# Patient Record
Sex: FEMALE | Race: WHITE | Employment: FULL TIME | URBAN - METROPOLITAN AREA
[De-identification: names, ages, dates, MRNs, and addresses within clinical notes are randomized per-mention and may not be internally consistent; named-entity substitution may affect disease eponyms.]

---

## 2018-07-16 ENCOUNTER — HOSPITAL ENCOUNTER (OUTPATIENT)
Age: 33
Discharge: HOME | End: 2018-07-18
Payer: COMMERCIAL

## 2018-07-16 DIAGNOSIS — N92.1 MENORRHAGIA WITH IRREGULAR CYCLE: ICD-10-CM

## 2018-07-16 PROCEDURE — 84702 CHORIONIC GONADOTROPIN TEST: CPT

## 2018-07-16 PROCEDURE — 84146 ASSAY OF PROLACTIN: CPT

## 2018-07-16 PROCEDURE — 84439 ASSAY OF FREE THYROXINE: CPT

## 2018-07-16 PROCEDURE — 84481 FREE ASSAY (FT-3): CPT

## 2018-07-16 PROCEDURE — 84443 ASSAY THYROID STIM HORMONE: CPT

## 2018-07-16 PROCEDURE — 85027 COMPLETE CBC AUTOMATED: CPT

## 2018-07-17 LAB
GONADOTROPIN, CHORIONIC (HCG) QUANT: <0.1 MIU/ML
HCT VFR BLD CALC: 30.8 % (ref 34–48)
HEMOGLOBIN: 8.9 G/DL (ref 11.5–15.5)
MCH RBC QN AUTO: 20.7 PG (ref 26–35)
MCHC RBC AUTO-ENTMCNC: 28.9 % (ref 32–34.5)
MCV RBC AUTO: 71.6 FL (ref 80–99.9)
PDW BLD-RTO: 19.1 FL (ref 11.5–15)
PLATELET # BLD: 349 E9/L (ref 130–450)
PMV BLD AUTO: 10.6 FL (ref 7–12)
PROLACTIN: 9.48 NG/ML
RBC # BLD: 4.3 E12/L (ref 3.5–5.5)
T3 FREE: 2.8 PG/ML (ref 2–4.4)
T4 FREE: 0.96 NG/DL (ref 0.93–1.7)
TSH SERPL DL<=0.05 MIU/L-ACNC: 3.13 UIU/ML (ref 0.27–4.2)
WBC # BLD: 7.9 E9/L (ref 4.5–11.5)

## 2018-10-15 ENCOUNTER — HOSPITAL ENCOUNTER (OUTPATIENT)
Age: 33
Discharge: HOME | End: 2018-10-17
Payer: COMMERCIAL

## 2018-10-15 PROCEDURE — 88175 CYTOPATH C/V AUTO FLUID REDO: CPT

## 2018-11-27 ENCOUNTER — HOSPITAL ENCOUNTER (OUTPATIENT)
Age: 33
Discharge: HOME | End: 2018-11-29
Payer: COMMERCIAL

## 2018-11-27 PROCEDURE — 88305 TISSUE EXAM BY PATHOLOGIST: CPT

## 2019-01-15 ENCOUNTER — HOSPITAL ENCOUNTER (OUTPATIENT)
Age: 34
Discharge: HOME | End: 2019-01-17

## 2019-01-15 PROCEDURE — 88307 TISSUE EXAM BY PATHOLOGIST: CPT

## 2019-01-15 PROCEDURE — 88342 IMHCHEM/IMCYTCHM 1ST ANTB: CPT

## 2019-01-15 PROCEDURE — 88305 TISSUE EXAM BY PATHOLOGIST: CPT

## 2019-07-11 ENCOUNTER — HOSPITAL ENCOUNTER (OUTPATIENT)
Age: 34
Discharge: HOME | End: 2019-07-13
Payer: COMMERCIAL

## 2019-07-11 PROCEDURE — 88175 CYTOPATH C/V AUTO FLUID REDO: CPT

## 2020-03-11 ENCOUNTER — HOSPITAL ENCOUNTER (OUTPATIENT)
Age: 35
Discharge: HOME | End: 2020-03-13
Payer: COMMERCIAL

## 2020-03-11 PROCEDURE — 88175 CYTOPATH C/V AUTO FLUID REDO: CPT

## 2020-03-11 PROCEDURE — 87624 HPV HI-RISK TYP POOLED RSLT: CPT

## 2020-03-30 LAB
HPV SAMPLE: NORMAL
HPV TYPE 16: NOT DETECTED
HPV TYPE 18: NOT DETECTED
HPV, HIGH RISK OTHER: NOT DETECTED
INTERPRETATION: NORMAL
SOURCE: NORMAL

## 2020-08-24 ENCOUNTER — HOSPITAL ENCOUNTER (OUTPATIENT)
Age: 35
Discharge: HOME | End: 2020-08-26
Payer: COMMERCIAL

## 2020-08-24 LAB
BACTERIA: ABNORMAL /HPF
BILIRUBIN URINE: NEGATIVE
BLOOD, URINE: NEGATIVE
CLARITY: CLEAR
COLOR: YELLOW
EPITHELIAL CELLS, UA: ABNORMAL /HPF
GLUCOSE URINE: NEGATIVE MG/DL
KETONES, URINE: NEGATIVE MG/DL
LEUKOCYTE ESTERASE, URINE: ABNORMAL
NITRITE, URINE: NEGATIVE
PH UA: 6 (ref 5–9)
PROTEIN UA: NEGATIVE MG/DL
RBC UA: ABNORMAL /HPF (ref 0–2)
SPECIFIC GRAVITY UA: 1.01 (ref 1–1.03)
UROBILINOGEN, URINE: 0.2 E.U./DL
WBC UA: ABNORMAL /HPF (ref 0–5)

## 2020-08-24 PROCEDURE — 87591 N.GONORRHOEAE DNA AMP PROB: CPT

## 2020-08-24 PROCEDURE — 81001 URINALYSIS AUTO W/SCOPE: CPT

## 2020-08-24 PROCEDURE — 87491 CHLMYD TRACH DNA AMP PROBE: CPT

## 2020-08-24 PROCEDURE — 87088 URINE BACTERIA CULTURE: CPT

## 2020-08-26 LAB — URINE CULTURE, ROUTINE: NORMAL

## 2020-08-27 LAB
C. TRACHOMATIS DNA ,URINE: NEGATIVE
N. GONORRHOEAE DNA, URINE: NEGATIVE
SOURCE: NORMAL

## 2020-09-09 ENCOUNTER — HOSPITAL ENCOUNTER (OUTPATIENT)
Age: 35
Discharge: HOME | End: 2020-09-11
Payer: COMMERCIAL

## 2020-09-09 LAB
GLUCOSE TOLERANCE TEST 1 HOUR: 77 MG/DL
GLUCOSE TOLERANCE TEST 2 HOUR: 59 MG/DL
GLUCOSE TOLERANCE TEST FASTING: 69 MG/DL
HBA1C MFR BLD: 4.9 % (ref 4–5.6)
HCT VFR BLD CALC: 37.4 % (ref 34–48)
HEMOGLOBIN: 12 G/DL (ref 11.5–15.5)
MCH RBC QN AUTO: 26.5 PG (ref 26–35)
MCHC RBC AUTO-ENTMCNC: 32.1 % (ref 32–34.5)
MCV RBC AUTO: 82.7 FL (ref 80–99.9)
PDW BLD-RTO: 23.3 FL (ref 11.5–15)
PLATELET # BLD: 196 E9/L (ref 130–450)
PMV BLD AUTO: 11.9 FL (ref 7–12)
RBC # BLD: 4.52 E12/L (ref 3.5–5.5)
TSH SERPL DL<=0.05 MIU/L-ACNC: 3.08 UIU/ML (ref 0.27–4.2)
WBC # BLD: 9.5 E9/L (ref 4.5–11.5)

## 2020-09-09 PROCEDURE — 86850 RBC ANTIBODY SCREEN: CPT

## 2020-09-09 PROCEDURE — 82951 GLUCOSE TOLERANCE TEST (GTT): CPT

## 2020-09-09 PROCEDURE — 85027 COMPLETE CBC AUTOMATED: CPT

## 2020-09-09 PROCEDURE — 84443 ASSAY THYROID STIM HORMONE: CPT

## 2020-09-09 PROCEDURE — 86703 HIV-1/HIV-2 1 RESULT ANTBDY: CPT

## 2020-09-09 PROCEDURE — 86900 BLOOD TYPING SEROLOGIC ABO: CPT

## 2020-09-09 PROCEDURE — 86592 SYPHILIS TEST NON-TREP QUAL: CPT

## 2020-09-09 PROCEDURE — 86762 RUBELLA ANTIBODY: CPT

## 2020-09-09 PROCEDURE — 86803 HEPATITIS C AB TEST: CPT

## 2020-09-09 PROCEDURE — 86777 TOXOPLASMA ANTIBODY: CPT

## 2020-09-09 PROCEDURE — 83036 HEMOGLOBIN GLYCOSYLATED A1C: CPT

## 2020-09-09 PROCEDURE — 86901 BLOOD TYPING SEROLOGIC RH(D): CPT

## 2020-09-09 PROCEDURE — 86778 TOXOPLASMA ANTIBODY IGM: CPT

## 2020-09-09 PROCEDURE — 87340 HEPATITIS B SURFACE AG IA: CPT

## 2020-09-09 PROCEDURE — 86787 VARICELLA-ZOSTER ANTIBODY: CPT

## 2020-09-10 LAB
ABO/RH: NORMAL
ANTIBODY SCREEN: NORMAL
HEPATITIS B SURFACE ANTIGEN INTERPRETATION: NORMAL
HEPATITIS C ANTIBODY INTERPRETATION: NORMAL
HIV-1 AND HIV-2 ANTIBODIES: NORMAL
RPR: NORMAL
RUBELLA ANTIBODY IGG: NORMAL
VARICELLA-ZOSTER VIRUS AB, IGG: NORMAL

## 2020-09-16 LAB
TOXOPLASMA IGM ANTIBODY: NORMAL
TOXOPLASMOSIS IGG AB: NORMAL

## 2021-01-07 DIAGNOSIS — O09.523 MULTIGRAVIDA OF ADVANCED MATERNAL AGE IN THIRD TRIMESTER: ICD-10-CM

## 2021-01-07 LAB
GLUCOSE TOLERANCE TEST 1 HOUR: 155 MG/DL
GLUCOSE TOLERANCE TEST 2 HOUR: 80 MG/DL
GLUCOSE TOLERANCE TEST FASTING: 70 MG/DL
HCT VFR BLD CALC: 36.6 % (ref 34–48)
HEMOGLOBIN: 11.8 G/DL (ref 11.5–15.5)
MCH RBC QN AUTO: 31.7 PG (ref 26–35)
MCHC RBC AUTO-ENTMCNC: 32.2 % (ref 32–34.5)
MCV RBC AUTO: 98.4 FL (ref 80–99.9)
PDW BLD-RTO: 14.1 FL (ref 11.5–15)
PLATELET # BLD: 180 E9/L (ref 130–450)
PMV BLD AUTO: 11.5 FL (ref 7–12)
RBC # BLD: 3.72 E12/L (ref 3.5–5.5)
WBC # BLD: 11.6 E9/L (ref 4.5–11.5)

## 2021-01-08 LAB
ABO/RH: NORMAL
ANTIBODY SCREEN: NORMAL

## 2021-03-01 DIAGNOSIS — O09.523 HIGH-RISK PREGNANCY, ELDERLY MULTIGRAVIDA IN THIRD TRIMESTER: ICD-10-CM

## 2021-03-01 LAB
HCT VFR BLD CALC: 34.9 % (ref 34–48)
HEMOGLOBIN: 11.5 G/DL (ref 11.5–15.5)
MCH RBC QN AUTO: 30.8 PG (ref 26–35)
MCHC RBC AUTO-ENTMCNC: 33 % (ref 32–34.5)
MCV RBC AUTO: 93.6 FL (ref 80–99.9)
PDW BLD-RTO: 13.2 FL (ref 11.5–15)
PLATELET # BLD: 181 E9/L (ref 130–450)
PMV BLD AUTO: 12.2 FL (ref 7–12)
RBC # BLD: 3.73 E12/L (ref 3.5–5.5)
WBC # BLD: 12 E9/L (ref 4.5–11.5)

## 2021-03-04 ENCOUNTER — HOSPITAL ENCOUNTER (INPATIENT)
Age: 36
LOS: 3 days | Discharge: HOME | End: 2021-03-08
Attending: EMERGENCY MEDICINE | Admitting: OBSTETRICS & GYNECOLOGY
Payer: COMMERCIAL

## 2021-03-04 DIAGNOSIS — O34.219 PREVIOUS CESAREAN SECTION COMPLICATING PREGNANCY: ICD-10-CM

## 2021-03-04 DIAGNOSIS — O34.219 DECLINES VBAC (VAGINAL BIRTH AFTER CESAREAN) TRIAL: ICD-10-CM

## 2021-03-04 DIAGNOSIS — I10 HYPERTENSION, UNSPECIFIED TYPE: Primary | ICD-10-CM

## 2021-03-04 DIAGNOSIS — O09.293 HISTORY OF PRE-ECLAMPSIA IN PRIOR PREGNANCY, CURRENTLY PREGNANT IN THIRD TRIMESTER: ICD-10-CM

## 2021-03-04 DIAGNOSIS — O09.523 HIGH RISK PREGNANCY, MULTIGRAVIDA OF ADVANCED MATERNAL AGE IN THIRD TRIMESTER: ICD-10-CM

## 2021-03-04 DIAGNOSIS — O09.893 HISTORY OF PRETERM DELIVERY, CURRENTLY PREGNANT IN THIRD TRIMESTER: ICD-10-CM

## 2021-03-04 LAB
BASOPHILS ABSOLUTE: 0.02 E9/L (ref 0–0.2)
BASOPHILS RELATIVE PERCENT: 0.2 % (ref 0–2)
C TRACH DNA GENITAL QL NAA+PROBE: NEGATIVE
EOSINOPHILS ABSOLUTE: 0.3 E9/L (ref 0.05–0.5)
EOSINOPHILS RELATIVE PERCENT: 2.4 % (ref 0–6)
GROUP B STREP CULTURE: NORMAL
HCT VFR BLD CALC: 34.1 % (ref 34–48)
HEMOGLOBIN: 11.4 G/DL (ref 11.5–15.5)
IMMATURE GRANULOCYTES #: 0.08 E9/L
IMMATURE GRANULOCYTES %: 0.7 % (ref 0–5)
LYMPHOCYTES ABSOLUTE: 2.47 E9/L (ref 1.5–4)
LYMPHOCYTES RELATIVE PERCENT: 20.1 % (ref 20–42)
MCH RBC QN AUTO: 30.8 PG (ref 26–35)
MCHC RBC AUTO-ENTMCNC: 33.4 % (ref 32–34.5)
MCV RBC AUTO: 92.2 FL (ref 80–99.9)
MONOCYTES ABSOLUTE: 0.72 E9/L (ref 0.1–0.95)
MONOCYTES RELATIVE PERCENT: 5.9 % (ref 2–12)
N. GONORRHOEAE DNA: NEGATIVE
NEUTROPHILS ABSOLUTE: 8.67 E9/L (ref 1.8–7.3)
NEUTROPHILS RELATIVE PERCENT: 70.7 % (ref 43–80)
PDW BLD-RTO: 13.2 FL (ref 11.5–15)
PLATELET # BLD: 195 E9/L (ref 130–450)
PMV BLD AUTO: 11.6 FL (ref 7–12)
RBC # BLD: 3.7 E12/L (ref 3.5–5.5)
SOURCE: NORMAL
WBC # BLD: 12.3 E9/L (ref 4.5–11.5)

## 2021-03-04 PROCEDURE — 85025 COMPLETE CBC W/AUTO DIFF WBC: CPT

## 2021-03-04 PROCEDURE — 80076 HEPATIC FUNCTION PANEL: CPT

## 2021-03-04 PROCEDURE — 99284 EMERGENCY DEPT VISIT MOD MDM: CPT

## 2021-03-04 PROCEDURE — 84550 ASSAY OF BLOOD/URIC ACID: CPT

## 2021-03-04 PROCEDURE — 80048 BASIC METABOLIC PNL TOTAL CA: CPT

## 2021-03-05 ENCOUNTER — ANESTHESIA EVENT (OUTPATIENT)
Dept: LABOR AND DELIVERY | Age: 36
End: 2021-03-05
Payer: COMMERCIAL

## 2021-03-05 ENCOUNTER — ANCILLARY PROCEDURE (OUTPATIENT)
Dept: OBGYN CLINIC | Age: 36
End: 2021-03-05
Payer: COMMERCIAL

## 2021-03-05 PROBLEM — O34.219 PREVIOUS CESAREAN SECTION COMPLICATING PREGNANCY: Status: ACTIVE | Noted: 2021-03-05

## 2021-03-05 PROBLEM — O09.293 HISTORY OF PRE-ECLAMPSIA IN PRIOR PREGNANCY, CURRENTLY PREGNANT IN THIRD TRIMESTER: Status: ACTIVE | Noted: 2021-03-05

## 2021-03-05 PROBLEM — O09.893 HISTORY OF PRETERM DELIVERY, CURRENTLY PREGNANT IN THIRD TRIMESTER: Status: ACTIVE | Noted: 2021-03-05

## 2021-03-05 PROBLEM — Z3A.36 36 WEEKS GESTATION OF PREGNANCY: Status: ACTIVE | Noted: 2021-03-05

## 2021-03-05 PROBLEM — O09.523 HIGH RISK PREGNANCY, MULTIGRAVIDA OF ADVANCED MATERNAL AGE IN THIRD TRIMESTER: Status: ACTIVE | Noted: 2021-03-05

## 2021-03-05 PROBLEM — Z34.93 NORMAL PREGNANCY, THIRD TRIMESTER: Status: ACTIVE | Noted: 2021-03-05

## 2021-03-05 PROBLEM — O16.3 ELEVATED BLOOD PRESSURE AFFECTING PREGNANCY IN THIRD TRIMESTER, ANTEPARTUM: Status: ACTIVE | Noted: 2021-03-05

## 2021-03-05 PROBLEM — O34.219 DECLINES VBAC (VAGINAL BIRTH AFTER CESAREAN) TRIAL: Status: ACTIVE | Noted: 2021-03-05

## 2021-03-05 LAB
ABO/RH: NORMAL
ALBUMIN SERPL-MCNC: 3.4 G/DL (ref 3.5–5.2)
ALP BLD-CCNC: 103 U/L (ref 35–104)
ALT SERPL-CCNC: 12 U/L (ref 0–32)
AMORPHOUS: ABNORMAL
AMPHETAMINE SCREEN, URINE: NOT DETECTED
ANION GAP SERPL CALCULATED.3IONS-SCNC: 10 MMOL/L (ref 7–16)
ANTIBODY SCREEN: NORMAL
AST SERPL-CCNC: 16 U/L (ref 0–31)
BACTERIA: ABNORMAL /HPF
BARBITURATE SCREEN URINE: NOT DETECTED
BENZODIAZEPINE SCREEN, URINE: NOT DETECTED
BILIRUB SERPL-MCNC: <0.2 MG/DL (ref 0–1.2)
BILIRUBIN DIRECT: <0.2 MG/DL (ref 0–0.3)
BILIRUBIN URINE: NEGATIVE
BILIRUBIN, INDIRECT: ABNORMAL MG/DL (ref 0–1)
BLOOD, URINE: NEGATIVE
BUN BLDV-MCNC: 10 MG/DL (ref 6–20)
CALCIUM SERPL-MCNC: 9 MG/DL (ref 8.6–10.2)
CANNABINOID SCREEN URINE: NOT DETECTED
CHLORIDE BLD-SCNC: 107 MMOL/L (ref 98–107)
CLARITY: CLEAR
CO2: 20 MMOL/L (ref 22–29)
COCAINE METABOLITE SCREEN URINE: NOT DETECTED
COLOR: YELLOW
CREAT SERPL-MCNC: 0.6 MG/DL (ref 0.5–1)
FENTANYL SCREEN, URINE: NOT DETECTED
GFR AFRICAN AMERICAN: >60
GFR NON-AFRICAN AMERICAN: >60 ML/MIN/1.73
GLUCOSE BLD-MCNC: 96 MG/DL (ref 74–99)
GLUCOSE URINE: NEGATIVE MG/DL
KETONES, URINE: ABNORMAL MG/DL
LEUKOCYTE ESTERASE, URINE: ABNORMAL
Lab: NORMAL
METHADONE SCREEN, URINE: NOT DETECTED
NITRITE, URINE: NEGATIVE
OPIATE SCREEN URINE: NOT DETECTED
OXYCODONE URINE: NOT DETECTED
PH UA: 6 (ref 5–9)
PHENCYCLIDINE SCREEN URINE: NOT DETECTED
POTASSIUM REFLEX MAGNESIUM: 3.6 MMOL/L (ref 3.5–5)
PROTEIN UA: NEGATIVE MG/DL
RBC UA: ABNORMAL /HPF (ref 0–2)
SODIUM BLD-SCNC: 137 MMOL/L (ref 132–146)
SPECIFIC GRAVITY UA: 1.02 (ref 1–1.03)
TOTAL PROTEIN: 6.2 G/DL (ref 6.4–8.3)
URIC ACID, SERUM: 3.8 MG/DL (ref 2.4–5.7)
UROBILINOGEN, URINE: 0.2 E.U./DL
WBC UA: ABNORMAL /HPF (ref 0–5)

## 2021-03-05 PROCEDURE — 6360000002 HC RX W HCPCS

## 2021-03-05 PROCEDURE — 81001 URINALYSIS AUTO W/SCOPE: CPT

## 2021-03-05 PROCEDURE — 36415 COLL VENOUS BLD VENIPUNCTURE: CPT

## 2021-03-05 PROCEDURE — 6370000000 HC RX 637 (ALT 250 FOR IP): Performed by: OBSTETRICS & GYNECOLOGY

## 2021-03-05 PROCEDURE — 99253 IP/OBS CNSLTJ NEW/EST LOW 45: CPT | Performed by: OBSTETRICS & GYNECOLOGY

## 2021-03-05 PROCEDURE — 76819 FETAL BIOPHYS PROFIL W/O NST: CPT | Performed by: OBSTETRICS & GYNECOLOGY

## 2021-03-05 PROCEDURE — 6360000002 HC RX W HCPCS: Performed by: OBSTETRICS & GYNECOLOGY

## 2021-03-05 PROCEDURE — 86850 RBC ANTIBODY SCREEN: CPT

## 2021-03-05 PROCEDURE — 86901 BLOOD TYPING SEROLOGIC RH(D): CPT

## 2021-03-05 PROCEDURE — 1220000000 HC SEMI PRIVATE OB R&B

## 2021-03-05 PROCEDURE — 86900 BLOOD TYPING SEROLOGIC ABO: CPT

## 2021-03-05 PROCEDURE — 2580000003 HC RX 258: Performed by: OBSTETRICS & GYNECOLOGY

## 2021-03-05 PROCEDURE — 76820 UMBILICAL ARTERY ECHO: CPT | Performed by: OBSTETRICS & GYNECOLOGY

## 2021-03-05 PROCEDURE — 80307 DRUG TEST PRSMV CHEM ANLYZR: CPT

## 2021-03-05 PROCEDURE — 76805 OB US >/= 14 WKS SNGL FETUS: CPT | Performed by: OBSTETRICS & GYNECOLOGY

## 2021-03-05 RX ORDER — ASPIRIN 81 MG/1
81 TABLET ORAL DAILY
Status: ON HOLD | COMMUNITY
End: 2021-03-06

## 2021-03-05 RX ORDER — ZOLPIDEM TARTRATE 5 MG/1
5 TABLET ORAL ONCE
Status: COMPLETED | OUTPATIENT
Start: 2021-03-05 | End: 2021-03-05

## 2021-03-05 RX ORDER — BETAMETHASONE SODIUM PHOSPHATE AND BETAMETHASONE ACETATE 3; 3 MG/ML; MG/ML
12 INJECTION, SUSPENSION INTRA-ARTICULAR; INTRALESIONAL; INTRAMUSCULAR; SOFT TISSUE EVERY 24 HOURS
Status: COMPLETED | OUTPATIENT
Start: 2021-03-05 | End: 2021-03-05

## 2021-03-05 RX ORDER — SODIUM CHLORIDE 0.9 % (FLUSH) 0.9 %
10 SYRINGE (ML) INJECTION 2 TIMES DAILY
Status: DISCONTINUED | OUTPATIENT
Start: 2021-03-05 | End: 2021-03-06

## 2021-03-05 RX ORDER — BETAMETHASONE SODIUM PHOSPHATE AND BETAMETHASONE ACETATE 3; 3 MG/ML; MG/ML
INJECTION, SUSPENSION INTRA-ARTICULAR; INTRALESIONAL; INTRAMUSCULAR; SOFT TISSUE
Status: COMPLETED
Start: 2021-03-05 | End: 2021-03-05

## 2021-03-05 RX ORDER — BETAMETHASONE SODIUM PHOSPHATE AND BETAMETHASONE ACETATE 3; 3 MG/ML; MG/ML
12 INJECTION, SUSPENSION INTRA-ARTICULAR; INTRALESIONAL; INTRAMUSCULAR; SOFT TISSUE EVERY 24 HOURS
Status: DISCONTINUED | OUTPATIENT
Start: 2021-03-05 | End: 2021-03-05

## 2021-03-05 RX ADMIN — BETAMETHASONE SODIUM PHOSPHATE AND BETAMETHASONE ACETATE 12 MG: 3; 3 INJECTION, SUSPENSION INTRA-ARTICULAR; INTRALESIONAL; INTRAMUSCULAR; SOFT TISSUE at 03:21

## 2021-03-05 RX ADMIN — ZOLPIDEM TARTRATE 5 MG: 5 TABLET ORAL at 04:02

## 2021-03-05 RX ADMIN — Medication 10 ML: at 21:00

## 2021-03-05 RX ADMIN — BETAMETHASONE SODIUM PHOSPHATE AND BETAMETHASONE ACETATE 12 MG: 3; 3 INJECTION, SUSPENSION INTRA-ARTICULAR; INTRALESIONAL; INTRAMUSCULAR; SOFT TISSUE at 23:13

## 2021-03-05 RX ADMIN — Medication 10 ML: at 13:52

## 2021-03-05 ASSESSMENT — PAIN - FUNCTIONAL ASSESSMENT: PAIN_FUNCTIONAL_ASSESSMENT: 0-10

## 2021-03-05 ASSESSMENT — LIFESTYLE VARIABLES: SMOKING_STATUS: 0

## 2021-03-05 NOTE — CONSULTS
Criss Portillo M.D.  [address] [place] [address], [address] [place] [address]     RE:  Sharmaine Wilson  : 1985   AGE: 38 y.o. This report has been created using voice recognition software. It may contain errors which are inherent in voice recognition technology. Dear Dr. Angeles Fernandes:    Skyler Benedict had a consultation today for the following indications:    Patient Active Problem List   Diagnosis    36 weeks gestation of pregnancy    High risk pregnancy, multigravida of advanced maternal age in third trimester    History of pre-eclampsia in prior pregnancy, currently pregnant in third trimester    Previous  section x 3 complicating pregnancy    Declines  (vaginal birth after ) trial    Elevated blood pressure affecting pregnancy in third trimester, antepartum    Hypertension     Skyler Benedict is a 38 y.o. female, who is G5(2,1,1,3). She has an Estimated Date of Delivery: 3/29/21 based on her previous ultrasound assessment. She is currently 36 weeks 4 days gestation based on that assessment. The patient was admitted to the labor and delivery unit for evaluation and management secondary to a complaint of edema and elevated blood pressures. Her blood pressure since admission have intermittently been in the severe range. She had a history of preeclampsia with her first pregnancy, necessitating delivery at 27 weeks gestational age. She takes no antihypertensive medication. She had no symptomatology related to hypertension. The patient has had 3 previous  section deliveries and plans to have a repeat  section delivery with her current pregnancy. The fetal heart rate tracing has been reassuring, with moderate variability and accelerations. An occasional uterine contraction was noted. A fetal ultrasound assessment was performed and a detailed report included in the EMR under the media tab.   A living newsome intrauterine fetus was identified in the breech presentation, with normal fetal heart motion and normal fetal motion noted. The amniotic fluid index was 11.6 cm. The estimated fetal weight was at the 89th growth percentile for gestational age. Visualization of the fetal anatomy was limited secondary to the advanced gestational age of the fetus and fetal position. The biophysical profile and cord Doppler studies were both reassuring. There was no evidence of absence, or reversal of end-diastolic flow.                  GENETIC SCREENING/TERATOLOGY COUNSELING              (Includes patient, FTB, and any affected family members)    Patient Age > 35 Years YES   Thalassemia ( MVC<80) NO   Congential Heart Defect NO   Neural Tube Defect NO   Jacob-Sachs NO   Sickle Cell Disease NO   Sickle Cell Trait NO   Sickle C Disease or Trait NO   Hemophilia NO   Muscular Dystrophy NO   Cystic Fibrosis NO   Ron Disease NO   Autism NO   Mental Retardation NO   History of Fragile X NO   Maternal Diabetes NO   Other Genetic Disease or Syndrome NO   Previous Child With Congenital Abnormality Not Listed NO   Recreational Drugs NO                                                                 INFECTION HISTORY     HEPATITIS IMMUNIZED:  NO   HEPATITIS INFECTION:  NO   EXPOSURE TO TB NO   GENITAL HERPES    NO   PARVOVIRUS B-19 NO   CHICKEN POX  YES   MEASLES NO   HIV NO   OTHER RASH OR VIRAL ILLNESS SINCE LMP NO   UTI RECURRENT NO   HPV NO     OB History    Para Term  AB Living   5 3 2 1 1 3   SAB TAB Ectopic Molar Multiple Live Births   1         3      # Outcome Date GA Lbr Hilario/2nd Weight Sex Delivery Anes PTL Lv   5 Current            4 SAB  5w0d          3 Term 05/14/10 39w0d  7 lb 14 oz (3.572 kg) F CS-LTranv Spinal N       Complications: S/P repeat low transverse    2 Term 10/27/08 39w0d  7 lb 9 oz (3.43 kg) M CS-LTranv Spinal N SEAN      Complications: S/P repeat low transverse    1  05 35w0d  5 lb 7 oz (2.466 kg) M CS-LTranv Spinal N SEAN      Complications: Severe preeclampsia       PAST GYNECOLOGICAL  HISTORY:  Positive for abnormal pap smears. Doesn't know the grade  Negative for sexually transmitted diseases. Positive for cervical LEEP   Positive for uterine surgery. Three previous C-sections  Negative for ovarian or tubal surgery. Past Medical History:   Diagnosis Date    Hypertension        Past Surgical History:   Procedure Laterality Date     SECTION      x3    COLPOSCOPY  2018    LEEP  01/15/2019       Allergies   Allergen Reactions    Percocet [Oxycodone-Acetaminophen]     Procardia [Nifedipine]        Current Facility-Administered Medications:     betamethasone acetate-betamethasone sodium phosphate (CELESTONE) injection 12 mg, 12 mg, Intramuscular, Q24H, Danielle Blanco MD    sodium chloride flush 0.9 % injection 10 mL, 10 mL, Intravenous, BID, Danielle Blanco MD, 10 mL at 21 1352    Social History     Tobacco Use    Smoking status: Never Smoker    Smokeless tobacco: Never Used   Substance Use Topics    Alcohol use: No     Comment: occ     FAMILY MEDICAL HISTORY:   Negative for congenital abnormalities, autism, genetic disease and mental retardation, not listed above. Review of Systems :   CONSTITUTIONAL : No fever, no chills   HEENT : No headache, no visual changes, no rhinorrhea, no sore throat   CARDIOVASCULAR : No pain, no palpitations, no edema   RESPIRATORY : No pain, no shortness of breath   GASTROINTESTINAL : No N/V, no D/C, no abdominal pain   GENITOURINARY : No dysuria, hematuria and no incontinence   MUSCULOSKELETAL : No myalgia, No back pain  NEUROLOGICAL : No numbness, no tingling, no tremors. No history of seizures  ALL OTHER SYSTEMS WERE REPORTED AS NEGATIVE.     PERTINENT PHYSICAL EXAMINATION:   Patient Vitals for the past 24 hrs:   BP Temp Temp src Pulse Resp SpO2 Height Weight   21 1441 (!) 139/91 -- -- 85 -- -- -- --   21 1240 (!) 147/73 -- -- 80 -- -- -- --   03/05/21 1037 (!) 145/81 -- -- 88 -- -- -- --   03/05/21 0937 138/77 -- -- 83 -- -- -- --   03/05/21 0911 (!) 168/88 -- -- 98 -- -- -- --   03/05/21 0840 (!) 164/77 -- -- 85 -- -- -- --   03/05/21 0837 (!) 169/83 -- -- 82 -- -- -- --   03/05/21 0734 (!) 141/80 98.2 °F (36.8 °C) Oral 83 18 -- -- --   03/05/21 0704 (!) 152/80 -- -- 80 -- -- -- --   03/05/21 0634 (!) 154/84 -- -- 80 -- -- -- --   03/05/21 0604 (!) 154/79 -- -- 82 -- -- -- --   03/05/21 0534 (!) 156/77 -- -- 76 -- -- -- --   03/05/21 0504 (!) 157/80 -- -- 76 -- -- -- --   03/05/21 0434 (!) 152/79 -- -- 68 -- -- -- --   03/05/21 0359 (!) 158/83 -- -- 72 -- -- -- --   03/05/21 0349 (!) 155/78 -- -- 72 -- -- -- --   03/05/21 0339 (!) 160/82 -- -- 70 -- -- -- --   03/05/21 0327 -- 98.4 °F (36.9 °C) -- -- -- -- -- --   03/05/21 0315 (!) 152/86 -- -- -- -- -- -- --   03/05/21 0236 (!) 150/78 -- -- 87 16 97 % -- --   03/04/21 2240 (!) 140/80 97.8 °F (36.6 °C) Oral 84 18 98 % 5' 4\" (1.626 m) 238 lb (108 kg)     A fetal ultrasound assessment was performed today. A report is enclosed for your review.     Admission on 03/04/2021   Component Date Value Ref Range Status    WBC 03/04/2021 12.3* 4.5 - 11.5 E9/L Final    RBC 03/04/2021 3.70  3.50 - 5.50 E12/L Final    Hemoglobin 03/04/2021 11.4* 11.5 - 15.5 g/dL Final    Hematocrit 03/04/2021 34.1  34.0 - 48.0 % Final    MCV 03/04/2021 92.2  80.0 - 99.9 fL Final    MCH 03/04/2021 30.8  26.0 - 35.0 pg Final    MCHC 03/04/2021 33.4  32.0 - 34.5 % Final    RDW 03/04/2021 13.2  11.5 - 15.0 fL Final    Platelets 40/63/7899 195  130 - 450 E9/L Final    MPV 03/04/2021 11.6  7.0 - 12.0 fL Final    Neutrophils % 03/04/2021 70.7  43.0 - 80.0 % Final    Immature Granulocytes % 03/04/2021 0.7  0.0 - 5.0 % Final    Lymphocytes % 03/04/2021 20.1  20.0 - 42.0 % Final    Monocytes % 03/04/2021 5.9  2.0 - 12.0 % Final    Eosinophils % 03/04/2021 2.4  0.0 - 6.0 % Final    Basophils % 03/04/2021 0.2  0.0 - 2.0 % Final    Neutrophils Absolute 03/04/2021 8.67* 1.80 - 7.30 E9/L Final    Immature Granulocytes # 03/04/2021 0.08  E9/L Final    Lymphocytes Absolute 03/04/2021 2.47  1.50 - 4.00 E9/L Final    Monocytes Absolute 03/04/2021 0.72  0.10 - 0.95 E9/L Final    Eosinophils Absolute 03/04/2021 0.30  0.05 - 0.50 E9/L Final    Basophils Absolute 03/04/2021 0.02  0.00 - 0.20 E9/L Final    Sodium 03/04/2021 137  132 - 146 mmol/L Final    Potassium reflex Magnesium 03/04/2021 3.6  3.5 - 5.0 mmol/L Final    Chloride 03/04/2021 107  98 - 107 mmol/L Final    CO2 03/04/2021 20* 22 - 29 mmol/L Final    Anion Gap 03/04/2021 10  7 - 16 mmol/L Final    Glucose 03/04/2021 96  74 - 99 mg/dL Final    BUN 03/04/2021 10  6 - 20 mg/dL Final    CREATININE 03/04/2021 0.6  0.5 - 1.0 mg/dL Final    GFR Non- 03/04/2021 >60  >=60 mL/min/1.73 Final    GFR  03/04/2021 >60   Final    Calcium 03/04/2021 9.0  8.6 - 10.2 mg/dL Final    Total Protein 03/04/2021 6.2* 6.4 - 8.3 g/dL Final    Albumin 03/04/2021 3.4* 3.5 - 5.2 g/dL Final    Alkaline Phosphatase 03/04/2021 103  35 - 104 U/L Final    ALT 03/04/2021 12  0 - 32 U/L Final    AST 03/04/2021 16  0 - 31 U/L Final    Total Bilirubin 03/04/2021 <0.2  0.0 - 1.2 mg/dL Final    Bilirubin, Direct 03/04/2021 <0.2  0.0 - 0.3 mg/dL Final    Bilirubin, Indirect 03/04/2021 see below  0.0 - 1.0 mg/dL Final    Uric Acid, Serum 03/04/2021 3.8  2.4 - 5.7 mg/dL Final    Color, UA 03/05/2021 Yellow  Straw/Yellow Final    Clarity, UA 03/05/2021 Clear  Clear Final    Glucose, Ur 03/05/2021 Negative  Negative mg/dL Final    Bilirubin Urine 03/05/2021 Negative  Negative Final    Ketones, Urine 03/05/2021 TRACE* Negative mg/dL Final    Specific Gravity, UA 03/05/2021 1.025  1.005 - 1.030 Final    Blood, Urine 03/05/2021 Negative  Negative Final    pH, UA 03/05/2021 6.0  5.0 - 9.0 Final    Protein, UA 03/05/2021 Negative  Negative mg/dL Final    Urobilinogen, Urine 2021 0.2  <2.0 E.U./dL Final    Nitrite, Urine 2021 Negative  Negative Final    Leukocyte Esterase, Urine 2021 MODERATE* Negative Final    Amphetamine Screen, Urine 2021 NOT DETECTED  Negative <1000 ng/mL Final    Barbiturate Screen, Ur 2021 NOT DETECTED  Negative < 200 ng/mL Final    Benzodiazepine Screen, Urine 2021 NOT DETECTED  Negative < 200 ng/mL Final    Cannabinoid Scrn, Ur 2021 NOT DETECTED  Negative < 50ng/mL Final    Cocaine Metabolite Screen, Urine 2021 NOT DETECTED  Negative < 300 ng/mL Final    Opiate Scrn, Ur 2021 NOT DETECTED  Negative < 300ng/mL Final    PCP Screen, Urine 2021 NOT DETECTED  Negative < 25 ng/mL Final    Methadone Screen, Urine 2021 NOT DETECTED  Negative <300 ng/mL Final    Oxycodone Urine 2021 NOT DETECTED  Negative <100 ng/mL Final    FENTANYL SCREEN, URINE 2021 NOT DETECTED  Negative <1 ng/mL Final    Drug Screen Comment: 2021 see below   Final    WBC, UA 2021 2-5  0 - 5 /HPF Final    RBC, UA 2021 1-3  0 - 2 /HPF Final    Bacteria, UA 2021 RARE* None Seen /HPF Final    Amorphous, UA 2021 FEW   Final    ABO/Rh 2021 O POS   Final    Antibody Screen 2021 NEG   Final     IMPRESSION:    1.  IUP at 36 weeks 4 days gestation based on her Estimated Date of Delivery: 3/29/21    2. AMA    3. Three previous C-sections    4. Maternal obesity    5. Declined screening for fetal aneuploidy earlier in her pregnancy     6. Possible UTI    7. Gestatiional hypertension with intermittent severe hypertension      8. Had gestational hypertension with her first pregnancy    9. Prior  delivery for severe pre-eclampsia    10. Reassuring biophysical profile cord Doppler testing  11. Luca breech presentation  12.   Estimated fetal weight is at the 89th growth percentile    PLAN:  As we discussed at the time of the patient's assessment, I recommended the patient be delivered after completing the Celestone administration secondary to gestational hypertension with intermittent severe elevations in her blood pressure. My recommendation is based on the ACOG Committee Opinion #[id number], from February 2021. The patient will complete the Celestone course today. A urine culture and sensitivity was ordered. DVT prophylaxis should be utilized throughout her admission. Further evaluation and management will be dependent on the results of patient's testing and her clinical presentation. If you have any questions regarding her management, please contact me at your convenience and thank you for allowing me to participate in her care.     Sincerely,        Raimundo Olsen MD, MS, Trav Ruiz, ALEJANDROCS, RDMS, RVT  Director [address] [place] [address]  [phone]

## 2021-03-05 NOTE — PROGRESS NOTES
at 36w4d presents foir leg swelling and increased BPs at home. Denies any HA, N/V, or visual changes. Reports good fetal movement. denies any vb, LOF or ctxs.

## 2021-03-05 NOTE — PROGRESS NOTES
Dr. Lizandro Zuniga updated on BP since 0911. New orders received, cycle BPs q 2 hours, patient may eat lunch if BPs stable.

## 2021-03-05 NOTE — ANESTHESIA PRE PROCEDURE
Department of Anesthesiology  Preprocedure Note       Name:  Ariana Gomez   Age:  38 y.o.  :  1985                                          MRN:  [id number]         Date:  3/5/2021      Surgeon: Anoop Helm):  Jules Torres MD    Procedure: Procedure(s):   SECTION    Medications prior to admission:   Prior to Admission medications    Medication Sig Start Date End Date Taking? Authorizing Provider   aspirin 81 MG EC tablet Take 81 mg by mouth daily   Yes Historical Provider, MD   Prenat w/o P-HR-[id number]-[id number]-DHA (PNV-DHA PO) Take by mouth   Yes Historical Provider, MD       Current medications:    Current Facility-Administered Medications   Medication Dose Route Frequency Provider Last Rate Last Admin    betamethasone acetate-betamethasone sodium phosphate (CELESTONE) injection 12 mg  12 mg Intramuscular Q24H Jules Torres MD           Allergies: Allergies   Allergen Reactions    Percocet [Oxycodone-Acetaminophen]     Procardia [Nifedipine]        Problem List:    Patient Active Problem List   Diagnosis Code    36 weeks gestation of pregnancy Z3A.36    High risk pregnancy, multigravida of advanced maternal age in third trimester O09.523    History of pre-eclampsia in prior pregnancy, currently pregnant in third trimester O0.36    History of  delivery at 27 weeks , first pregnancy with severe preeclampsia, currently pregnant in third trimester O09.893    Previous  section x 3 complicating pregnancy H21.461    Declines  (vaginal birth after ) trial O31.200    Normal pregnancy, third trimester Z34.93       Past Medical History:  History reviewed. No pertinent past medical history.     Past Surgical History:        Procedure Laterality Date     SECTION      x3    COLPOSCOPY  2018    LEEP  01/15/2019       Social History:    Social History     Tobacco Use    Smoking status: Never Smoker    Smokeless tobacco: Never Used   Substance Use Topics    Alcohol use: No     Comment: occ                                Counseling given: Not Answered      Vital Signs (Current):   Vitals:    03/05/21 0840 03/05/21 0911 03/05/21 0937 03/05/21 1037   BP: (!) 164/77 (!) 168/88 138/77 (!) 145/81   Pulse: 85 98 83 88   Resp:       Temp:       TempSrc:       SpO2:       Weight:       Height:                                                  BP Readings from Last 3 Encounters:   03/05/21 (!) 145/81   03/01/21 120/80   02/04/21 120/82       NPO Status:                                                                                 BMI:   Wt Readings from Last 3 Encounters:   03/04/21 238 lb (108 kg)   03/01/21 238 lb 9.6 oz (108.2 kg)   02/04/21 228 lb (103.4 kg)     Body mass index is 40.85 kg/m². CBC:   Lab Results   Component Value Date    WBC 12.3 03/04/2021    RBC 3.70 03/04/2021    HGB 11.4 03/04/2021    HCT 34.1 03/04/2021    MCV 92.2 03/04/2021    RDW 13.2 03/04/2021     03/04/2021       CMP:   Lab Results   Component Value Date     03/04/2021    K 3.6 03/04/2021     03/04/2021    CO2 20 03/04/2021    BUN 10 03/04/2021    CREATININE 0.6 03/04/2021    GFRAA >60 03/04/2021    LABGLOM >60 03/04/2021    GLUCOSE 96 03/04/2021    PROT 6.2 03/04/2021    CALCIUM 9.0 03/04/2021    BILITOT <0.2 03/04/2021    ALKPHOS 103 03/04/2021    AST 16 03/04/2021    ALT 12 03/04/2021       POC Tests: No results for input(s): POCGLU, POCNA, POCK, POCCL, POCBUN, POCHEMO, POCHCT in the last 72 hours.     Coags: No results found for: PROTIME, INR, APTT    HCG (If Applicable):   Lab Results   Component Value Date    PREGTESTUR Negative 11/27/2018    HCG 11.8 (H) 08/24/2013        ABGs: No results found for: PHART, PO2ART, HCB4OZZ, XJM5OGH, BEART, E7NSAFTS     Type & Screen (If Applicable):  No results found for: LABABO, LABRH    Drug/Infectious Status (If Applicable):  No results found for: HIV, HEPCAB    COVID-19 Screening (If Applicable): No results found for: 2021    CREATININE 0.6 2021    CALCIUM 9.0 2021    GFRAA >60 2021    LABGLOM >60 2021    GLUCOSE 96 2021     POCGlucose  Recent Labs     21  2304   GLUCOSE 96        Coags  No results found for: PROTIME, INR, APTT    HCG (If Applicable)   Lab Results   Component Value Date    PREGTESTUR Negative 2018    HCG 11.8 (H) 2013        ABGs   No results found for: PHART, PO2ART, UNK5NVO, BHC7FWU, BEART, K5GJOVGR     Type & Screen (If Applicable)  Lab Results   Component Value Date    ABORH O POS 2021     Active Problem List with ICD10 Codes  Patient Active Problem List   Diagnosis Code    36 weeks gestation of pregnancy Z3A.36    High risk pregnancy, multigravida of advanced maternal age in third trimester O09.523    History of pre-eclampsia in prior pregnancy, currently pregnant in third trimester O0.36    History of  delivery at 28 weeks , first pregnancy with severe preeclampsia, currently pregnant in third trimester O09.893    Previous  section x 3 complicating pregnancy G67.437    Declines  (vaginal birth after ) trial O34.219    Normal pregnancy, third trimester Z34.93       SHERICE Landa CRNA  2021  11:32 AM      SHERICE Landa CRNA   3/5/2021

## 2021-03-05 NOTE — H&P
History:    Social History     Socioeconomic History    Marital status:      Spouse name: Not on file    Number of children: Not on file    Years of education: Not on file    Highest education level: Not on file   Occupational History    Not on file   Social Needs    Financial resource strain: Not on file    Food insecurity     Worry: Not on file     Inability: Not on file    Transportation needs     Medical: Not on file     Non-medical: Not on file   Tobacco Use    Smoking status: Never Smoker    Smokeless tobacco: Never Used   Substance and Sexual Activity    Alcohol use: No     Comment: occ    Drug use: No    Sexual activity: Yes     Partners: Male   Lifestyle    Physical activity     Days per week: Not on file     Minutes per session: Not on file    Stress: Not on file   Relationships    Social connections     Talks on phone: Not on file     Gets together: Not on file     Attends [employer] service: Not on file     Active member of club or organization: Not on file     Attends meetings of clubs or organizations: Not on file     Relationship status: Not on file    Intimate partner violence     Fear of current or ex partner: Not on file     Emotionally abused: Not on file     Physically abused: Not on file     Forced sexual activity: Not on file   Other Topics Concern    Not on file   Social History Narrative    Not on file       Family History:   No family history on file. Medications Prior to Admission:  Not in a hospital admission.     REVIEW OF SYSTEMS:  CONSTITUTIONAL:  negative  RESPIRATORY:  negative  CARDIOVASCULAR:  negative  GASTROINTESTINAL:  negative  ALLERGIC/IMMUNOLOGIC:  negative  NEUROLOGICAL:  negative  BEHAVIOR/PSYCH:  negative    PHYSICAL EXAM:  Vitals:    03/04/21 2240 03/05/21 0236   BP: (!) 140/80 (!) 150/78   Pulse: 84 87   Resp: 18 16   Temp: 97.8 °F (36.6 °C)    TempSrc: Oral    SpO2: 98% 97%   Weight: 238 lb (108 kg)    Height: 5' 4\" (1.626 m)      General appearance:  awake, alert, cooperative, no apparent distress, and appears stated age  Skin: warm, dry, normal color, no rash  Heart:  Regular rate & rhythm, S1 S2, no murmurs, rubs, or gallops  Lungs:  No increased work of breathing, good air exchange, CTA b/l. Abdomen:  Soft, non tender, gravid, consistent with her gestational age  Extremities: No clubbing, cyanosis, cords; No calf tenderness; Edema: no  Fetal heart rate:  Reassuring.   Pelvis:  Not examined  Contraction frequency:  0 minutes  Membranes:  Intact    Labs:   Recent Results (from the past 72 hour(s))   CBC Auto Differential    Collection Time: 03/04/21 11:04 PM   Result Value Ref Range    WBC 12.3 (H) 4.5 - 11.5 E9/L    RBC 3.70 3.50 - 5.50 E12/L    Hemoglobin 11.4 (L) 11.5 - 15.5 g/dL    Hematocrit 34.1 34.0 - 48.0 %    MCV 92.2 80.0 - 99.9 fL    MCH 30.8 26.0 - 35.0 pg    MCHC 33.4 32.0 - 34.5 %    RDW 13.2 11.5 - 15.0 fL    Platelets 469 803 - 914 E9/L    MPV 11.6 7.0 - 12.0 fL    Neutrophils % 70.7 43.0 - 80.0 %    Immature Granulocytes % 0.7 0.0 - 5.0 %    Lymphocytes % 20.1 20.0 - 42.0 %    Monocytes % 5.9 2.0 - 12.0 %    Eosinophils % 2.4 0.0 - 6.0 %    Basophils % 0.2 0.0 - 2.0 %    Neutrophils Absolute 8.67 (H) 1.80 - 7.30 E9/L    Immature Granulocytes # 0.08 E9/L    Lymphocytes Absolute 2.47 1.50 - 4.00 E9/L    Monocytes Absolute 0.72 0.10 - 0.95 E9/L    Eosinophils Absolute 0.30 0.05 - 0.50 E9/L    Basophils Absolute 0.02 0.00 - 0.20 J9/Q   Basic Metabolic Panel w/ Reflex to MG    Collection Time: 03/04/21 11:04 PM   Result Value Ref Range    Sodium 137 132 - 146 mmol/L    Potassium reflex Magnesium 3.6 3.5 - 5.0 mmol/L    Chloride 107 98 - 107 mmol/L    CO2 20 (L) 22 - 29 mmol/L    Anion Gap 10 7 - 16 mmol/L    Glucose 96 74 - 99 mg/dL    BUN 10 6 - 20 mg/dL    CREATININE 0.6 0.5 - 1.0 mg/dL    GFR Non-African American >60 >=60 mL/min/1.73    GFR African American >60     Calcium 9.0 8.6 - 10.2 mg/dL   Hepatic Function Panel    Collection Time: 21 11:04 PM   Result Value Ref Range    Total Protein 6.2 (L) 6.4 - 8.3 g/dL    Albumin 3.4 (L) 3.5 - 5.2 g/dL    Alkaline Phosphatase 103 35 - 104 U/L    ALT 12 0 - 32 U/L    AST 16 0 - 31 U/L    Total Bilirubin <0.2 0.0 - 1.2 mg/dL    Bilirubin, Direct <0.2 0.0 - 0.3 mg/dL    Bilirubin, Indirect see below 0.0 - 1.0 mg/dL   Uric acid    Collection Time: 21 11:04 PM   Result Value Ref Range    Uric Acid, Serum 3.8 2.4 - 5.7 mg/dL       ASSESSMENT:   38 y.o.  W female at 37w2d [id number]    Patient Active Problem List   Diagnosis    36 weeks gestation of pregnancy    High risk pregnancy, multigravida of advanced maternal age in third trimester    History of pre-eclampsia in prior pregnancy, currently pregnant in third trimester    History of  delivery at 28 weeks , first pregnancy with severe preeclampsia, currently pregnant in third trimester    Previous  section x 3 complicating pregnancy    Declines  (vaginal birth after ) trial     Fetus: Reassuring  GBS: negative    PLAN:  Orders Placed This Encounter   Procedures    CBC Auto Differential    Basic Metabolic Panel w/ Reflex to MG    Hepatic Function Panel    Uric acid    Urinalysis    DRUG SCREEN MULTI URINE    Diet NPO Effective Now Exceptions are: Ice Chips, Sips of Water with Meds, Sips of Clear Liquids, Popsicles    Vital signs per unit routine    Notify physician for persistent blood pressure elevations, nonreassuring fetal status, abnormal lab results    I/O per unit routine    Continuous external fetal heart rate monitoring    External uterine contraction monitoring    Full Code    Inpatient consult to Obstetrics / Gynecology    Nonrebreather mask oxygen    Insert peripheral IV    PATIENT STATUS (DIRECT) Observation     Current Facility-Administered Medications   Medication Dose Route Frequency Provider Last Rate Last Admin    betamethasone acetate-betamethasone sodium phosphate (CELESTONE)

## 2021-03-05 NOTE — PROGRESS NOTES
Dr. John Patel called in, states he spoke with M regarding this AMs BPs. Plan is for repeat C/s tomorrow 3/6/2021 at 0900, unless BPs continue to be elevated. Orders received.

## 2021-03-05 NOTE — PROGRESS NOTES
BPs reviewed with Dr. Mabel Clayton. Patient currently sitting up eating. Will recheck BP in 30 minutes.

## 2021-03-05 NOTE — ED PROVIDER NOTES
HPI:  3/5/21,   Time: 1:47 AM YENNY Gomez is a 38 y.o. female presenting to the ED for elevated blood pressure. Patient is approximately 39 weeks gestational age. Patient does have history of preeclampsia in her previous pregnancy. Patient presents today because she noticed some swelling in her legs, states she took her blood pressure at home and it was noted to be elevated so she presented to the ED. Patient otherwise asymptomatic, she denies any other complaints, no headache, no changes in vision, no extremity numbness/tingling or weakness, no chest pain or shortness of breath, no abdominal pain, no pelvic cramping, no vaginal bleeding. Patient is following closely with obstetrician. ROS:   GEN: no fevers, no chills, no fatique  HENT: No trauma, no sore throat, no swelling throat or oral mucosa  EYES: No changes in vision, no pain  CARDIO: No chest pain, no palpitations  PULM: No trouble breathing, no wheezing  ABD: No pain, no nausea, no vomiting  MSK: No pain, no trauma, + bilateral lower extremity edema  SKIN: No rashes, no abrasions, no lacerations  NEURO: No changes in mentation, no headache, no weakness     --------------------------------------------- PAST HISTORY ---------------------------------------------  Past Medical History:  has no past medical history on file. Past Surgical History:  has a past surgical history that includes  section; Colposcopy (2018); and LEEP (01/15/2019). Social History:  reports that she has never smoked. She has never used smokeless tobacco. She reports that she does not drink alcohol or use drugs. Family History: family history is not on file. The patients home medications have been reviewed.     Allergies: Percocet [oxycodone-acetaminophen] and Procardia [nifedipine]    -------------------------------------------------- RESULTS -------------------------------------------------  All laboratory and radiology results have been personally reviewed by myself   LABS:  Results for orders placed or performed during the hospital encounter of 03/04/21   CBC Auto Differential   Result Value Ref Range    WBC 12.3 (H) 4.5 - 11.5 E9/L    RBC 3.70 3.50 - 5.50 E12/L    Hemoglobin 11.4 (L) 11.5 - 15.5 g/dL    Hematocrit 34.1 34.0 - 48.0 %    MCV 92.2 80.0 - 99.9 fL    MCH 30.8 26.0 - 35.0 pg    MCHC 33.4 32.0 - 34.5 %    RDW 13.2 11.5 - 15.0 fL    Platelets 893 252 - 355 E9/L    MPV 11.6 7.0 - 12.0 fL    Neutrophils % 70.7 43.0 - 80.0 %    Immature Granulocytes % 0.7 0.0 - 5.0 %    Lymphocytes % 20.1 20.0 - 42.0 %    Monocytes % 5.9 2.0 - 12.0 %    Eosinophils % 2.4 0.0 - 6.0 %    Basophils % 0.2 0.0 - 2.0 %    Neutrophils Absolute 8.67 (H) 1.80 - 7.30 E9/L    Immature Granulocytes # 0.08 E9/L    Lymphocytes Absolute 2.47 1.50 - 4.00 E9/L    Monocytes Absolute 0.72 0.10 - 0.95 E9/L    Eosinophils Absolute 0.30 0.05 - 0.50 E9/L    Basophils Absolute 0.02 0.00 - 0.20 E8/Q   Basic Metabolic Panel w/ Reflex to MG   Result Value Ref Range    Sodium 137 132 - 146 mmol/L    Potassium reflex Magnesium 3.6 3.5 - 5.0 mmol/L    Chloride 107 98 - 107 mmol/L    CO2 20 (L) 22 - 29 mmol/L    Anion Gap 10 7 - 16 mmol/L    Glucose 96 74 - 99 mg/dL    BUN 10 6 - 20 mg/dL    CREATININE 0.6 0.5 - 1.0 mg/dL    GFR Non-African American >60 >=60 mL/min/1.73    GFR African American >60     Calcium 9.0 8.6 - 10.2 mg/dL   Hepatic Function Panel   Result Value Ref Range    Total Protein 6.2 (L) 6.4 - 8.3 g/dL    Albumin 3.4 (L) 3.5 - 5.2 g/dL    Alkaline Phosphatase 103 35 - 104 U/L    ALT 12 0 - 32 U/L    AST 16 0 - 31 U/L    Total Bilirubin <0.2 0.0 - 1.2 mg/dL    Bilirubin, Direct <0.2 0.0 - 0.3 mg/dL    Bilirubin, Indirect see below 0.0 - 1.0 mg/dL   Uric acid   Result Value Ref Range    Uric Acid, Serum 3.8 2.4 - 5.7 mg/dL       RADIOLOGY:  Interpreted by Radiologist.  No orders to display       ------------------------- NURSING NOTES AND VITALS REVIEWED ---------------------------   The nursing notes within the ED encounter and vital signs as below have been reviewed. BP (!) 140/80 Comment: manual  Pulse 84   Temp 97.8 °F (36.6 °C) (Oral)   Resp 18   Ht 5' 4\" (1.626 m)   Wt 238 lb (108 kg)   LMP 06/22/2020   SpO2 98%   BMI 40.85 kg/m²   Oxygen Saturation Interpretation: Normal    ---------------------------------------------------PHYSICAL EXAM--------------------------------------    GEN: No acute distress, well-appearing, well nourished   HENT: Normocephalic, atraumatic, oral mucosa moist  EYES: No scleral injection, no scleral icterus, PERRL   PULM: Lungs clear to ascultation bilaterally, no wheezes, no crackles  CARDIO: Regular rate, regular rhythm, normal S1/S2  ABD: Soft, non-tender, no rigidity, + gravid abdomen  MSK: No deformities, palpable pulses all extremities, 1+ pitting edema bilateral lower extremities  SKIN: No rashes, no lacerations, no abrasions   NEURO: Awake, alert, appropriate, cranial nerves II through XII grossly intact, no neurological deficits        ------------------------------ ED COURSE/MEDICAL DECISION MAKING----------------------  Medications - No data to display      ED Course and Medical Decision Making:   Patient presents for elevated blood pressure pregnancy, patient proximately 39 gestational age, blood pressure ranging from 140-160/ throughout ED stay, patient with mild pitting edema, laboratory work-up unremarkable. Patient nontoxic and well-appearing throughout ED stay. Spoke with patient's obstetrician, Dr. Cindy Bullard, case discussed, states he would like to see patient up in labor and delivery department at this time for reevaluation and ongoing management. Patient transferred to labor and delivery.   Agreeable to plan.       --------------------------------- ADDITIONAL PROVIDER NOTES ---------------------------------    This patient's ED course included: re-evaluation prior to disposition and a personal

## 2021-03-06 ENCOUNTER — ANESTHESIA (OUTPATIENT)
Dept: LABOR AND DELIVERY | Age: 36
End: 2021-03-06
Payer: COMMERCIAL

## 2021-03-06 VITALS — SYSTOLIC BLOOD PRESSURE: 129 MMHG | OXYGEN SATURATION: 99 % | DIASTOLIC BLOOD PRESSURE: 69 MMHG

## 2021-03-06 PROBLEM — O99.019 IRON DEFICIENCY ANEMIA OF PREGNANCY: Status: ACTIVE | Noted: 2021-03-06

## 2021-03-06 PROBLEM — D50.9 IRON DEFICIENCY ANEMIA OF PREGNANCY: Status: ACTIVE | Noted: 2021-03-06

## 2021-03-06 PROCEDURE — 59514 CESAREAN DELIVERY ONLY: CPT | Performed by: OBSTETRICS & GYNECOLOGY

## 2021-03-06 PROCEDURE — 2709999900 HC NON-CHARGEABLE SUPPLY: Performed by: OBSTETRICS & GYNECOLOGY

## 2021-03-06 PROCEDURE — 88307 TISSUE EXAM BY PATHOLOGIST: CPT

## 2021-03-06 PROCEDURE — 3609079900 HC CESAREAN SECTION: Performed by: OBSTETRICS & GYNECOLOGY

## 2021-03-06 PROCEDURE — 1220000000 HC SEMI PRIVATE OB R&B

## 2021-03-06 PROCEDURE — 3700000001 HC ADD 15 MINUTES (ANESTHESIA): Performed by: OBSTETRICS & GYNECOLOGY

## 2021-03-06 PROCEDURE — 2580000003 HC RX 258: Performed by: OBSTETRICS & GYNECOLOGY

## 2021-03-06 PROCEDURE — 2500000003 HC RX 250 WO HCPCS: Performed by: NURSE ANESTHETIST, CERTIFIED REGISTERED

## 2021-03-06 PROCEDURE — 2580000003 HC RX 258: Performed by: NURSE ANESTHETIST, CERTIFIED REGISTERED

## 2021-03-06 PROCEDURE — 7100000001 HC PACU RECOVERY - ADDTL 15 MIN: Performed by: OBSTETRICS & GYNECOLOGY

## 2021-03-06 PROCEDURE — 2500000003 HC RX 250 WO HCPCS: Performed by: OBSTETRICS & GYNECOLOGY

## 2021-03-06 PROCEDURE — 6360000002 HC RX W HCPCS: Performed by: NURSE ANESTHETIST, CERTIFIED REGISTERED

## 2021-03-06 PROCEDURE — 6370000000 HC RX 637 (ALT 250 FOR IP): Performed by: OBSTETRICS & GYNECOLOGY

## 2021-03-06 PROCEDURE — 6360000002 HC RX W HCPCS: Performed by: OBSTETRICS & GYNECOLOGY

## 2021-03-06 PROCEDURE — 6370000000 HC RX 637 (ALT 250 FOR IP)

## 2021-03-06 PROCEDURE — 7100000000 HC PACU RECOVERY - FIRST 15 MIN: Performed by: OBSTETRICS & GYNECOLOGY

## 2021-03-06 PROCEDURE — 3700000000 HC ANESTHESIA ATTENDED CARE: Performed by: OBSTETRICS & GYNECOLOGY

## 2021-03-06 PROCEDURE — 6360000002 HC RX W HCPCS

## 2021-03-06 RX ORDER — KETOROLAC TROMETHAMINE 30 MG/ML
INJECTION, SOLUTION INTRAMUSCULAR; INTRAVENOUS PRN
Status: DISCONTINUED | OUTPATIENT
Start: 2021-03-06 | End: 2021-03-06 | Stop reason: SDUPTHER

## 2021-03-06 RX ORDER — SODIUM CHLORIDE, SODIUM LACTATE, POTASSIUM CHLORIDE, CALCIUM CHLORIDE 600; 310; 30; 20 MG/100ML; MG/100ML; MG/100ML; MG/100ML
INJECTION, SOLUTION INTRAVENOUS CONTINUOUS
Status: DISCONTINUED | OUTPATIENT
Start: 2021-03-06 | End: 2021-03-08 | Stop reason: HOSPADM

## 2021-03-06 RX ORDER — SODIUM CHLORIDE 0.9 % (FLUSH) 0.9 %
10 SYRINGE (ML) INJECTION EVERY 12 HOURS SCHEDULED
Status: DISCONTINUED | OUTPATIENT
Start: 2021-03-06 | End: 2021-03-08 | Stop reason: HOSPADM

## 2021-03-06 RX ORDER — BISACODYL 10 MG
10 SUPPOSITORY, RECTAL RECTAL DAILY PRN
Status: DISCONTINUED | OUTPATIENT
Start: 2021-03-08 | End: 2021-03-08 | Stop reason: HOSPADM

## 2021-03-06 RX ORDER — MEPERIDINE HYDROCHLORIDE 25 MG/ML
25 INJECTION INTRAMUSCULAR; INTRAVENOUS; SUBCUTANEOUS EVERY 4 HOURS PRN
Status: DISCONTINUED | OUTPATIENT
Start: 2021-03-06 | End: 2021-03-08 | Stop reason: HOSPADM

## 2021-03-06 RX ORDER — OXYCODONE HYDROCHLORIDE AND ACETAMINOPHEN 5; 325 MG/1; MG/1
2 TABLET ORAL EVERY 4 HOURS PRN
Status: DISCONTINUED | OUTPATIENT
Start: 2021-03-07 | End: 2021-03-08 | Stop reason: HOSPADM

## 2021-03-06 RX ORDER — SIMETHICONE 80 MG
80 TABLET,CHEWABLE ORAL EVERY 6 HOURS PRN
Status: DISCONTINUED | OUTPATIENT
Start: 2021-03-06 | End: 2021-03-08 | Stop reason: HOSPADM

## 2021-03-06 RX ORDER — MORPHINE SULFATE 10 MG/ML
INJECTION, SOLUTION INTRAMUSCULAR; INTRAVENOUS PRN
Status: DISCONTINUED | OUTPATIENT
Start: 2021-03-06 | End: 2021-03-06 | Stop reason: SDUPTHER

## 2021-03-06 RX ORDER — METRONIDAZOLE 500 MG/1
500 TABLET ORAL EVERY 8 HOURS SCHEDULED
Status: COMPLETED | OUTPATIENT
Start: 2021-03-06 | End: 2021-03-08

## 2021-03-06 RX ORDER — FERROUS SULFATE 325(65) MG
325 TABLET ORAL 2 TIMES DAILY WITH MEALS
Status: DISCONTINUED | OUTPATIENT
Start: 2021-03-06 | End: 2021-03-08 | Stop reason: HOSPADM

## 2021-03-06 RX ORDER — NALOXONE HYDROCHLORIDE 0.4 MG/ML
0.4 INJECTION, SOLUTION INTRAMUSCULAR; INTRAVENOUS; SUBCUTANEOUS PRN
Status: DISCONTINUED | OUTPATIENT
Start: 2021-03-06 | End: 2021-03-08 | Stop reason: HOSPADM

## 2021-03-06 RX ORDER — DIPHENHYDRAMINE HYDROCHLORIDE 50 MG/ML
25 INJECTION INTRAMUSCULAR; INTRAVENOUS EVERY 6 HOURS PRN
Status: DISCONTINUED | OUTPATIENT
Start: 2021-03-06 | End: 2021-03-08 | Stop reason: HOSPADM

## 2021-03-06 RX ORDER — NALBUPHINE HCL 10 MG/ML
5 AMPUL (ML) INJECTION EVERY 4 HOURS PRN
Status: DISCONTINUED | OUTPATIENT
Start: 2021-03-06 | End: 2021-03-08 | Stop reason: HOSPADM

## 2021-03-06 RX ORDER — BUPIVACAINE HYDROCHLORIDE 7.5 MG/ML
INJECTION, SOLUTION INTRASPINAL PRN
Status: DISCONTINUED | OUTPATIENT
Start: 2021-03-06 | End: 2021-03-06 | Stop reason: SDUPTHER

## 2021-03-06 RX ORDER — MODIFIED LANOLIN
OINTMENT (GRAM) TOPICAL
Status: DISCONTINUED | OUTPATIENT
Start: 2021-03-06 | End: 2021-03-08 | Stop reason: HOSPADM

## 2021-03-06 RX ORDER — SODIUM CHLORIDE 0.9 % (FLUSH) 0.9 %
10 SYRINGE (ML) INJECTION PRN
Status: DISCONTINUED | OUTPATIENT
Start: 2021-03-06 | End: 2021-03-08 | Stop reason: HOSPADM

## 2021-03-06 RX ORDER — ONDANSETRON 2 MG/ML
4 INJECTION INTRAMUSCULAR; INTRAVENOUS EVERY 6 HOURS PRN
Status: DISCONTINUED | OUTPATIENT
Start: 2021-03-06 | End: 2021-03-08 | Stop reason: HOSPADM

## 2021-03-06 RX ORDER — KETOROLAC TROMETHAMINE 30 MG/ML
30 INJECTION, SOLUTION INTRAMUSCULAR; INTRAVENOUS EVERY 6 HOURS PRN
Status: DISCONTINUED | OUTPATIENT
Start: 2021-03-06 | End: 2021-03-06

## 2021-03-06 RX ORDER — SODIUM CHLORIDE, SODIUM LACTATE, POTASSIUM CHLORIDE, AND CALCIUM CHLORIDE .6; .31; .03; .02 G/100ML; G/100ML; G/100ML; G/100ML
1000 INJECTION, SOLUTION INTRAVENOUS ONCE
Status: COMPLETED | OUTPATIENT
Start: 2021-03-06 | End: 2021-03-06

## 2021-03-06 RX ORDER — SODIUM CHLORIDE, SODIUM LACTATE, POTASSIUM CHLORIDE, CALCIUM CHLORIDE 600; 310; 30; 20 MG/100ML; MG/100ML; MG/100ML; MG/100ML
INJECTION, SOLUTION INTRAVENOUS CONTINUOUS PRN
Status: DISCONTINUED | OUTPATIENT
Start: 2021-03-06 | End: 2021-03-06 | Stop reason: SDUPTHER

## 2021-03-06 RX ORDER — ACETAMINOPHEN 325 MG/1
325 TABLET ORAL EVERY 4 HOURS PRN
Status: DISCONTINUED | OUTPATIENT
Start: 2021-03-06 | End: 2021-03-08 | Stop reason: HOSPADM

## 2021-03-06 RX ORDER — ACETAMINOPHEN 325 MG/1
650 TABLET ORAL EVERY 4 HOURS PRN
Status: DISCONTINUED | OUTPATIENT
Start: 2021-03-06 | End: 2021-03-08 | Stop reason: HOSPADM

## 2021-03-06 RX ORDER — OXYCODONE HYDROCHLORIDE AND ACETAMINOPHEN 5; 325 MG/1; MG/1
1 TABLET ORAL EVERY 4 HOURS PRN
Status: DISCONTINUED | OUTPATIENT
Start: 2021-03-07 | End: 2021-03-08 | Stop reason: HOSPADM

## 2021-03-06 RX ORDER — TRISODIUM CITRATE DIHYDRATE AND CITRIC ACID MONOHYDRATE 500; 334 MG/5ML; MG/5ML
SOLUTION ORAL
Status: COMPLETED
Start: 2021-03-06 | End: 2021-03-06

## 2021-03-06 RX ORDER — PRENATAL WITH FERROUS FUM AND FOLIC ACID 3080; 920; 120; 400; 22; 1.84; 3; 20; 10; 1; 12; 200; 27; 25; 2 [IU]/1; [IU]/1; MG/1; [IU]/1; MG/1; MG/1; MG/1; MG/1; MG/1; MG/1; UG/1; MG/1; MG/1; MG/1; MG/1
TABLET ORAL
Status: DISCONTINUED | OUTPATIENT
Start: 2021-03-06 | End: 2021-03-08 | Stop reason: HOSPADM

## 2021-03-06 RX ORDER — DOCUSATE SODIUM 100 MG/1
100 CAPSULE, LIQUID FILLED ORAL 2 TIMES DAILY
Status: DISCONTINUED | OUTPATIENT
Start: 2021-03-06 | End: 2021-03-08 | Stop reason: HOSPADM

## 2021-03-06 RX ORDER — TRISODIUM CITRATE DIHYDRATE AND CITRIC ACID MONOHYDRATE 500; 334 MG/5ML; MG/5ML
30 SOLUTION ORAL ONCE
Status: COMPLETED | OUTPATIENT
Start: 2021-03-06 | End: 2021-03-06

## 2021-03-06 RX ORDER — ONDANSETRON 2 MG/ML
INJECTION INTRAMUSCULAR; INTRAVENOUS PRN
Status: DISCONTINUED | OUTPATIENT
Start: 2021-03-06 | End: 2021-03-06 | Stop reason: SDUPTHER

## 2021-03-06 RX ADMIN — METRONIDAZOLE 500 MG: 500 TABLET ORAL at 22:50

## 2021-03-06 RX ADMIN — ONDANSETRON 4 MG: 2 INJECTION INTRAMUSCULAR; INTRAVENOUS at 09:38

## 2021-03-06 RX ADMIN — SODIUM CHLORIDE, POTASSIUM CHLORIDE, SODIUM LACTATE AND CALCIUM CHLORIDE: 600; 310; 30; 20 INJECTION, SOLUTION INTRAVENOUS at 09:08

## 2021-03-06 RX ADMIN — FAMOTIDINE 20 MG: 10 INJECTION INTRAVENOUS at 21:05

## 2021-03-06 RX ADMIN — DOCUSATE SODIUM 100 MG: 100 CAPSULE, LIQUID FILLED ORAL at 21:05

## 2021-03-06 RX ADMIN — NALBUPHINE HYDROCHLORIDE 5 MG: 10 INJECTION, SOLUTION INTRAMUSCULAR; INTRAVENOUS; SUBCUTANEOUS at 13:52

## 2021-03-06 RX ADMIN — KETOROLAC TROMETHAMINE 30 MG: 30 INJECTION, SOLUTION INTRAMUSCULAR; INTRAVENOUS at 10:04

## 2021-03-06 RX ADMIN — SODIUM CHLORIDE, POTASSIUM CHLORIDE, SODIUM LACTATE AND CALCIUM CHLORIDE: 600; 310; 30; 20 INJECTION, SOLUTION INTRAVENOUS at 16:52

## 2021-03-06 RX ADMIN — SODIUM CHLORIDE, POTASSIUM CHLORIDE, SODIUM LACTATE AND CALCIUM CHLORIDE: 600; 310; 30; 20 INJECTION, SOLUTION INTRAVENOUS at 08:15

## 2021-03-06 RX ADMIN — Medication 909 ML/HR: at 09:37

## 2021-03-06 RX ADMIN — SODIUM CHLORIDE, PRESERVATIVE FREE 10 ML: 5 INJECTION INTRAVENOUS at 21:05

## 2021-03-06 RX ADMIN — FAMOTIDINE 20 MG: 10 INJECTION INTRAVENOUS at 13:52

## 2021-03-06 RX ADMIN — MORPHINE SULFATE 0.15 MG: 10 INJECTION INTRAVENOUS at 09:13

## 2021-03-06 RX ADMIN — METRONIDAZOLE 500 MG: 500 TABLET ORAL at 15:08

## 2021-03-06 RX ADMIN — TRISODIUM CITRATE DIHYDRATE AND CITRIC ACID MONOHYDRATE 30 ML: 500; 334 SOLUTION ORAL at 09:00

## 2021-03-06 RX ADMIN — CEFAZOLIN 1000 MG: 1 INJECTION, POWDER, FOR SOLUTION INTRAMUSCULAR; INTRAVENOUS at 16:50

## 2021-03-06 RX ADMIN — BUPIVACAINE HYDROCHLORIDE IN DEXTROSE 1.6 ML: 7.5 INJECTION, SOLUTION SUBARACHNOID at 09:13

## 2021-03-06 RX ADMIN — SODIUM CHLORIDE, POTASSIUM CHLORIDE, SODIUM LACTATE AND CALCIUM CHLORIDE 1000 ML: 600; 310; 30; 20 INJECTION, SOLUTION INTRAVENOUS at 07:10

## 2021-03-06 RX ADMIN — Medication 2000 MG: at 09:01

## 2021-03-06 RX ADMIN — SODIUM CITRATE AND CITRIC ACID MONOHYDRATE 30 ML: 500; 334 SOLUTION ORAL at 09:00

## 2021-03-06 ASSESSMENT — PULMONARY FUNCTION TESTS
PIF_VALUE: 0

## 2021-03-06 NOTE — PROGRESS NOTES
L&D PROGRESS NOTE:    Patient:  Leo Dickens     Admit Date:  3/4/2021 10:46 PM  Medical Record Number:  [id number]   Today's Date: 3/6/2021    S: The patient is a 38 y.o. W female [id number] at 37w2d. Patient presented 3/5/21 with a chief complaint of increased swelling on the day of presentation, so she started monitoring her BP's which gradually increased from the 130/80 to the 140-150/80-90's. Fingers tingly - a little swollen - had severe preeclampsia with her first and was delivered by caesarean section at 35 weeks. She had 2 additional  sections subsequent to that. Had COVID 19 two weeks ago. During the observation. Patient's blood pressures were labile. A dose of Celestone was administered. A maternal-fetal medicine consultation with Dr. Jose Johnson was obtained due to prematurity. A fetal ultrasound assessment was performed which identified a living newsome intrauterine fetus was identified in the breech presentation, with normal fetal heart motion and normal fetal motion noted. The amniotic fluid index was 11.6 cm. The estimated fetal weight was at the 89th growth percentile for gestational age. Visualization of the fetal anatomy was limited secondary to the advanced gestational age of the fetus and fetal position. The biophysical profile and cord Doppler studies were both reassuring. There was no evidence of absence, or reversal of end-diastolic flow. Patient was diagnosed with gestational hypertension with intermittent severe hypertension (164/90, 169/83, 168/88) and admitted for continued blood pressure pressure monitoring and administration of the second dose of Celestone followed by delivery. Delivery would be indicated as advised by Dr. Jose Johnson based on the ACOG Committee Opinion #519 from 2021 secondary to gestational hypertension with intermittent severe elevations.     O:   Vitals:    21 0333 21 0533 21 0733 21 0831   BP: (!) 151/79 133/78 (!) 142/75    Pulse: 89 84 69    Resp:   16    Temp:    98.6 °F (37 °C)   TempSrc:    Oral   SpO2:       Weight:       Height:         FHR: Baseline 120, moderate variability, accelerations present, decelerations absent -category 1  Cervix: Not examined  Membranes: Intact. A:35 y.o. W female at 44w9d [id number]  High risk multigravida of advanced maternal age  Gestational hypertension with severe features  History of preeclampsia in prior pregnancy delivered at 28 weeks by  section\  History of prior  section x3  Declines   Breech presentation  Patient Active Problem List   Diagnosis    36 5/7 weeks gestation of pregnancy    High risk pregnancy, multigravida of advanced maternal age in third trimester    History of pre-eclampsia in prior pregnancy, currently pregnant in third trimester    Previous  section x 3 complicating pregnancy    Declines  (vaginal birth after ) trial    Elevated blood pressure affecting pregnancy in third trimester, antepartum    Gestational hypertension affecting fourth pregnancy with severe features    Breech presentation    Iron deficiency anemia of pregnancy     Fetal status: Reassuring  GBS: negative    P: Dr. Jules Torres MD notified of patient exam, vitals and fetal status. The recommendation for delivery was discussed. Management options were reviewed, the risks of surgery were discussed, including, but not limited to that of anesthesia, infection, hemorrhage, transfusion, blood borne disease, bowel/bladder/ureteral/vascular injury, and any corrective surgeries (bladder, ureter, bowel, hysterectomy), uterine or cervical lacerations, injury to the baby, DVT,PE, pain and death. Questions were answered to both the patient's and FOB/family satisfaction and informed consent was obtained to proceed as planned. Prepare for C/S. See orders per Dr. Jules Torres MD.    Jules Torres M.D.  FACOG  3/6/2021 9:25 AM

## 2021-03-06 NOTE — ANESTHESIA PROCEDURE NOTES
Spinal Block    Patient location during procedure: OB  Start time: 3/6/2021 9:08 AM  End time: 3/6/2021 9:13 AM  Reason for block: primary anesthetic and at surgeon's request  Staffing  Performed: resident/CRNA   Anesthesiologist: Deng Jacob MD  Resident/CRNA: SHERICE Uriarte CRNA  Preanesthetic Checklist  Completed: patient identified, IV checked, site marked, risks and benefits discussed, surgical consent, monitors and equipment checked, pre-op evaluation, timeout performed, anesthesia consent given, oxygen available and patient being monitored  Spinal Block  Patient position: sitting  Prep: Betadine  Patient monitoring: cardiac monitor, continuous pulse ox, continuous capnometry and frequent blood pressure checks  Approach: midline  Location: L3/L4  Provider prep: mask, sterile gloves and sterile gown  Local infiltration: lidocaine  Agent: bupivacaine  Adjuvant: duramorph  Dose: 1.6  Dose: 1.6  Needle  Needle type: Pencan   Needle gauge: 25 G  Needle length: 3.5 in  Assessment  Sensory level: T4  Swirl obtained: Yes  CSF: clear  Attempts: 1  Hemodynamics: stable

## 2021-03-06 NOTE — PROGRESS NOTES
Pt brought over via stretcher by L+D nurse in stable condition. Was able to transfer self into bed. IV LR and IV pitocin infusing per order. Pt is alert and oriented and denies pain. Fundus firm, midline and at level of umbilicus. Silver mepelex dressing. Trimble cath draining clear yellow urine. SCD's and pulse ox applied. Bedside pamphlets reviewed with pt-gave verbal permission for baby to receive Hep B vaccine. Oriented to room and unit and unit policies including Infant safety and covid 19 policies.

## 2021-03-06 NOTE — PROGRESS NOTES
Trimble catheter removed. 800cc clear ana/yellow urine in CD bag. Pt sat on edge of bed, stood up with contact guard and walked to BR. Unable to void. Annita care provided. Back to bed. Scd\"s and pulse ox reapplied. Tolerated walk to BR well. IV hep locked. Pt tolerated clear and full liquids well. Advanced to general diet.

## 2021-03-06 NOTE — LACTATION NOTE
Pt wants to pump & bottle feed her milk. Set up with Symphony pump at bedside & instructed on use, frequency to pump & cleaning of pump parts. Pt requests electric breast pump for home to increase her milk supply.

## 2021-03-06 NOTE — OP NOTE
Operative Note      Patient: Bibi Paez  YOB: 1985  MRN: [id number]    Date of Procedure: 3/6/2021    Pre-Op Diagnosis: High risk multigravida of advanced maternal age at 42 weeks 5 days; Gestational hypertension with severe features;  History of preeclampsia in prior pregnancy delivered at 35 weeks by  section; history of prior  section x3; declines ; breech presentation  Patient Active Problem List   Diagnosis    36 5/7 weeks gestation of pregnancy    High risk pregnancy, multigravida of advanced maternal age in third trimester    History of pre-eclampsia in prior pregnancy, currently pregnant in third trimester    Previous  section x 3 complicating pregnancy    Declines  (vaginal birth after ) trial    Elevated blood pressure affecting pregnancy in third trimester, antepartum    Gestational hypertension affecting fourth pregnancy with severe features    Breech presentation    Iron deficiency anemia of pregnancy     Post-Op Diagnosis: Same, viable male infant delivered  Patient Active Problem List   Diagnosis    36 5/7 weeks gestation of pregnancy    High risk pregnancy, multigravida of advanced maternal age in third trimester    History of pre-eclampsia in prior pregnancy, currently pregnant in third trimester    Previous  section x 3 complicating pregnancy    Declines  (vaginal birth after ) trial    Elevated blood pressure affecting pregnancy in third trimester, antepartum    Gestational hypertension affecting fourth pregnancy with severe features    Breech presentation    Iron deficiency anemia of pregnancy     delivery, delivered, current hospitalization    Term birth of  male       Procedure(s): Repeat Low Transverse  Section Via Pfannenstiel skin incision    Surgeon(s): Chayo Gracia MD    Assistant: Sidney Okeefe MD    Anesthesia: Spinal, with Duramorph    Complications: None    Estimated Blood Loss (mL): 500    Fluids Replaced (mL): 1500    Urine Output (mL): 500, clear    Drains:   Urethral Catheter Non-latex 16 fr (Active)      Implants:  * No implants in log *      Intraoperative Medications: Ancef 2 g IV on-call to the OR; intravenous Pitocin drip after delivery of placenta; Toradol 30 mg IV at conclusion of the case. Indication For Procedure: The patient is a 35 y.o. W female [id number] at 36w4d. Patient presented 3/5/21 with a chief complaint of increased swelling on the day of presentation, so she started monitoring her BP's which gradually increased from the 130/80 to the 140-150/80-90's. Fingers tingly - a little swollen - had severe preeclampsia with her first and was delivered by caesarean section at 35 weeks. She had 2 additional  sections subsequent to that. Had COVID 19 two weeks ago. During the observation. Patient's blood pressures were labile. A dose of Celestone was administered. A maternal-fetal medicine consultation with Dr. Eber Skinner was obtained due to prematurity. A fetal ultrasound assessment was performed which identified a living newsome intrauterine fetus was identified in the breech presentation, with normal fetal heart motion and normal fetal motion noted.  The amniotic fluid index was 11.6 cm.  The estimated fetal weight was at the 89th growth percentile for gestational age. Justine Due of the fetal anatomy was limited secondary to the advanced gestational age of the fetus and fetal position.  The biophysical profile and cord Doppler studies were both reassuring. Estelle Cade was no evidence of absence, or reversal of end-diastolic flow. Patient was diagnosed with gestational hypertension with intermittent severe hypertension (164/90, 169/83, 168/88) and admitted for continued blood pressure pressure monitoring and administration of the second dose of Celestone followed by delivery.   Delivery would be indicated as advised by Dr. Eber Skinner elevated. The underlying rectus muscle was dissected using sharp and blunt dissection. Attention was then turned to the inferior aspect of this incision, which in a similar fashion was grasped with Kocher clamps and elevated. The underlying rectus muscle was dissected off using sharp and blunt dissection. The rectus muscles were then  in the midline, peritoneum identified, tented up, and entered with the Metzenbaum scissors. The peritoneal incision was extended superiorly and inferiorly with good visualization of the bladder. The bladder blade was then inserted and the vesicouterine peritoneum identified, grasped with pickups, entered sharply with Metzenbaum scissors. This incision was extended laterally and the bladder flap created digitally. The bladder blade was then removed. The large Des retractor was then inserted and the lower uterine segment incised in a transverse fasion with the scalpel. The uterine incision was then extended laterally with blunt digital dissection. The infant's breech delivered atraumatically by complete breech extraction with the appropriate amount of fundal pressure. The nose and mouth were suctioned with bulb suction and the remaining infant was delivered. The cord was clamped and cut after a 30 second delay. The infant was handed off to the waiting nurses. The placenta was then removed with gentle cord traction and the uterus exteriorized and cleared of all clot and debris. The uterine incision was then repaired with 1 Chromic in a running, locked fashion. A second layer of the same suture was used to obtain excellent hemostasis. The bladder flap was repaired with 3-0 Vicryl in a running stitch and the uterus was returned to the abdomen. The gutters were then cleared of all clot and debris. The anterior abdominal wall peritoneum was closed with a running stitch of 3-0 Vicryl. The fascia was reapproximated with 1 Stratafix in a running fashion.   The subcutaneous tissue was reapproximated in two layers, using a running stitch of 3-0 plain suture. The skin was closed with the Insorb subcuticular stapling device. The Mepilex Border Post-op Ag Dressing was placed as a sterile bandage. The patient tolerated the procedure well. Sponge, lap, needle, and instruments were correct x2. Two g of Ancef were given on call to the OR, followed by Pitocin drip after delivery of the placenta.   The patient was taken to the Recovery Room in awake, stable, postoperative state    Electronically signed by Sneha Cruz MD on 3/6/2021 at 11:33 AM

## 2021-03-06 NOTE — PROGRESS NOTES
Repeat LTCS of viable baby boy at 0972. Baby pink and warm, no delayed cord clamping. Baby and mom stable. APGARS 9/9.

## 2021-03-07 LAB
BASOPHILS ABSOLUTE: 0.03 E9/L (ref 0–0.2)
BASOPHILS RELATIVE PERCENT: 0.2 % (ref 0–2)
EOSINOPHILS ABSOLUTE: 0.16 E9/L (ref 0.05–0.5)
EOSINOPHILS RELATIVE PERCENT: 1.1 % (ref 0–6)
HCT VFR BLD CALC: 29.3 % (ref 34–48)
HEMOGLOBIN: 9.7 G/DL (ref 11.5–15.5)
IMMATURE GRANULOCYTES #: 0.09 E9/L
IMMATURE GRANULOCYTES %: 0.6 % (ref 0–5)
LYMPHOCYTES ABSOLUTE: 1.71 E9/L (ref 1.5–4)
LYMPHOCYTES RELATIVE PERCENT: 11.6 % (ref 20–42)
MCH RBC QN AUTO: 31.3 PG (ref 26–35)
MCHC RBC AUTO-ENTMCNC: 33.1 % (ref 32–34.5)
MCV RBC AUTO: 94.5 FL (ref 80–99.9)
MONOCYTES ABSOLUTE: 0.88 E9/L (ref 0.1–0.95)
MONOCYTES RELATIVE PERCENT: 6 % (ref 2–12)
NEUTROPHILS ABSOLUTE: 11.91 E9/L (ref 1.8–7.3)
NEUTROPHILS RELATIVE PERCENT: 80.5 % (ref 43–80)
PDW BLD-RTO: 13.7 FL (ref 11.5–15)
PLATELET # BLD: 170 E9/L (ref 130–450)
PMV BLD AUTO: 11.5 FL (ref 7–12)
RBC # BLD: 3.1 E12/L (ref 3.5–5.5)
WBC # BLD: 14.8 E9/L (ref 4.5–11.5)

## 2021-03-07 PROCEDURE — 6360000002 HC RX W HCPCS: Performed by: OBSTETRICS & GYNECOLOGY

## 2021-03-07 PROCEDURE — 6370000000 HC RX 637 (ALT 250 FOR IP): Performed by: OBSTETRICS & GYNECOLOGY

## 2021-03-07 PROCEDURE — 2580000003 HC RX 258: Performed by: OBSTETRICS & GYNECOLOGY

## 2021-03-07 PROCEDURE — 36415 COLL VENOUS BLD VENIPUNCTURE: CPT

## 2021-03-07 PROCEDURE — 85025 COMPLETE CBC W/AUTO DIFF WBC: CPT

## 2021-03-07 PROCEDURE — 1220000000 HC SEMI PRIVATE OB R&B

## 2021-03-07 RX ORDER — IBUPROFEN 600 MG/1
600 TABLET ORAL EVERY 6 HOURS PRN
Status: DISCONTINUED | OUTPATIENT
Start: 2021-03-07 | End: 2021-03-08 | Stop reason: HOSPADM

## 2021-03-07 RX ADMIN — FERROUS SULFATE TAB 325 MG (65 MG ELEMENTAL FE) 325 MG: 325 (65 FE) TAB at 09:27

## 2021-03-07 RX ADMIN — DOCUSATE SODIUM 100 MG: 100 CAPSULE, LIQUID FILLED ORAL at 09:27

## 2021-03-07 RX ADMIN — METRONIDAZOLE 500 MG: 500 TABLET ORAL at 14:21

## 2021-03-07 RX ADMIN — METRONIDAZOLE 500 MG: 500 TABLET ORAL at 22:32

## 2021-03-07 RX ADMIN — CEFAZOLIN 1000 MG: 1 INJECTION, POWDER, FOR SOLUTION INTRAMUSCULAR; INTRAVENOUS at 01:09

## 2021-03-07 RX ADMIN — METRONIDAZOLE 500 MG: 500 TABLET ORAL at 06:21

## 2021-03-07 RX ADMIN — IBUPROFEN 600 MG: 600 TABLET ORAL at 20:14

## 2021-03-07 RX ADMIN — IBUPROFEN 600 MG: 600 TABLET ORAL at 14:21

## 2021-03-07 RX ADMIN — SIMETHICONE 80 MG: 80 TABLET, CHEWABLE ORAL at 09:27

## 2021-03-07 RX ADMIN — DOCUSATE SODIUM 100 MG: 100 CAPSULE, LIQUID FILLED ORAL at 20:14

## 2021-03-07 RX ADMIN — METFORMIN HYDROCHLORIDE: 500 TABLET, EXTENDED RELEASE ORAL at 09:27

## 2021-03-07 RX ADMIN — FERROUS SULFATE TAB 325 MG (65 MG ELEMENTAL FE) 325 MG: 325 (65 FE) TAB at 18:29

## 2021-03-07 RX ADMIN — Medication: at 18:29

## 2021-03-07 ASSESSMENT — PAIN DESCRIPTION - LOCATION: LOCATION: ABDOMEN

## 2021-03-07 ASSESSMENT — PAIN DESCRIPTION - RADICULAR PAIN: RADICULAR_PAIN: ABSENT

## 2021-03-07 ASSESSMENT — PAIN DESCRIPTION - PROGRESSION: CLINICAL_PROGRESSION: GRADUALLY WORSENING

## 2021-03-07 ASSESSMENT — PAIN DESCRIPTION - ORIENTATION: ORIENTATION: LOWER

## 2021-03-07 ASSESSMENT — PAIN DESCRIPTION - FREQUENCY: FREQUENCY: INTERMITTENT

## 2021-03-07 NOTE — PROGRESS NOTES
POD #1    Patient:  Fiona Gutierrezing     Admit Date:  3/4/2021 10:46 PM  Medical Record Number:  [id number]   Today's Date: 3/7/2021    S: No complaints; tolerating diet: yes -general; ambulating well: yes -up in room and in halls; voiding without difficulty:  yes -no urinary complaints; bm: denies; flatus: yes -normal; pain meds appropriate: yes -has not taken any since IV Toradol; vaginal bleeding: Less than a period.     O:   Vitals:    03/07/21 0555 03/07/21 0652 03/07/21 0717 03/07/21 0936   BP: 124/74  130/67    Pulse: 66 64 69 72   Resp: 16 16 16 15   Temp: 98.1 °F (36.7 °C)  98 °F (36.7 °C)    TempSrc: Oral  Oral    SpO2: 98% 99%  98%   Weight:       Height:         Gen: A&O, cooperative, pleasant   Heart: RRR   Lungs: CTA b/l   Abd; soft, NT, non distended, +BS  Back: no CVA or paraspinal tenderness   Ext: No clubbing, cyanosis, edema:no , no cords palpable, no calf tenderness   Neuro: intact   Inc: clean, dry, intact with Mepilex  Uterus: firm, well contracted, nt   Annita pad: staining only, thin lochia    Recent Results (from the past 72 hour(s))   CBC Auto Differential    Collection Time: 03/04/21 11:04 PM   Result Value Ref Range    WBC 12.3 (H) 4.5 - 11.5 E9/L    RBC 3.70 3.50 - 5.50 E12/L    Hemoglobin 11.4 (L) 11.5 - 15.5 g/dL    Hematocrit 34.1 34.0 - 48.0 %    MCV 92.2 80.0 - 99.9 fL    MCH 30.8 26.0 - 35.0 pg    MCHC 33.4 32.0 - 34.5 %    RDW 13.2 11.5 - 15.0 fL    Platelets 586 171 - 784 E9/L    MPV 11.6 7.0 - 12.0 fL    Neutrophils % 70.7 43.0 - 80.0 %    Immature Granulocytes % 0.7 0.0 - 5.0 %    Lymphocytes % 20.1 20.0 - 42.0 %    Monocytes % 5.9 2.0 - 12.0 %    Eosinophils % 2.4 0.0 - 6.0 %    Basophils % 0.2 0.0 - 2.0 %    Neutrophils Absolute 8.67 (H) 1.80 - 7.30 E9/L    Immature Granulocytes # 0.08 E9/L    Lymphocytes Absolute 2.47 1.50 - 4.00 E9/L    Monocytes Absolute 0.72 0.10 - 0.95 E9/L    Eosinophils Absolute 0.30 0.05 - 0.50 E9/L    Basophils Absolute 0.02 0.00 - 0.20 E9/L   Basic Metabolic Panel w/ Reflex to MG    Collection Time: 03/04/21 11:04 PM   Result Value Ref Range    Sodium 137 132 - 146 mmol/L    Potassium reflex Magnesium 3.6 3.5 - 5.0 mmol/L    Chloride 107 98 - 107 mmol/L    CO2 20 (L) 22 - 29 mmol/L    Anion Gap 10 7 - 16 mmol/L    Glucose 96 74 - 99 mg/dL    BUN 10 6 - 20 mg/dL    CREATININE 0.6 0.5 - 1.0 mg/dL    GFR Non-African American >60 >=60 mL/min/1.73    GFR African American >60     Calcium 9.0 8.6 - 10.2 mg/dL   Hepatic Function Panel    Collection Time: 03/04/21 11:04 PM   Result Value Ref Range    Total Protein 6.2 (L) 6.4 - 8.3 g/dL    Albumin 3.4 (L) 3.5 - 5.2 g/dL    Alkaline Phosphatase 103 35 - 104 U/L    ALT 12 0 - 32 U/L    AST 16 0 - 31 U/L    Total Bilirubin <0.2 0.0 - 1.2 mg/dL    Bilirubin, Direct <0.2 0.0 - 0.3 mg/dL    Bilirubin, Indirect see below 0.0 - 1.0 mg/dL   Uric acid    Collection Time: 03/04/21 11:04 PM   Result Value Ref Range    Uric Acid, Serum 3.8 2.4 - 5.7 mg/dL   Urinalysis    Collection Time: 03/05/21  3:20 AM   Result Value Ref Range    Color, UA Yellow Straw/Yellow    Clarity, UA Clear Clear    Glucose, Ur Negative Negative mg/dL    Bilirubin Urine Negative Negative    Ketones, Urine TRACE (A) Negative mg/dL    Specific Gravity, UA 1.025 1.005 - 1.030    Blood, Urine Negative Negative    pH, UA 6.0 5.0 - 9.0    Protein, UA Negative Negative mg/dL    Urobilinogen, Urine 0.2 <2.0 E.U./dL    Nitrite, Urine Negative Negative    Leukocyte Esterase, Urine MODERATE (A) Negative   DRUG SCREEN MULTI URINE    Collection Time: 03/05/21  3:20 AM   Result Value Ref Range    Amphetamine Screen, Urine NOT DETECTED Negative <1000 ng/mL    Barbiturate Screen, Ur NOT DETECTED Negative < 200 ng/mL    Benzodiazepine Screen, Urine NOT DETECTED Negative < 200 ng/mL    Cannabinoid Scrn, Ur NOT DETECTED Negative < 50ng/mL    Cocaine Metabolite Screen, Urine NOT DETECTED Negative < 300 ng/mL    Opiate Scrn, Ur NOT DETECTED Negative < 300ng/mL    PCP Screen, Urine NOT DETECTED Negative < 25 ng/mL    Methadone Screen, Urine NOT DETECTED Negative <300 ng/mL    Oxycodone Urine NOT DETECTED Negative <100 ng/mL    FENTANYL SCREEN, URINE NOT DETECTED Negative <1 ng/mL    Drug Screen Comment: see below    Microscopic Urinalysis    Collection Time: 21  3:20 AM   Result Value Ref Range    WBC, UA 2-5 0 - 5 /HPF    RBC, UA 1-3 0 - 2 /HPF    Bacteria, UA RARE (A) None Seen /HPF    Amorphous, UA FEW    TYPE AND SCREEN    Collection Time: 21  7:27 AM   Result Value Ref Range    ABO/Rh O POS     Antibody Screen NEG    CBC auto differential    Collection Time: 21  5:55 AM   Result Value Ref Range    WBC 14.8 (H) 4.5 - 11.5 E9/L    RBC 3.10 (L) 3.50 - 5.50 E12/L    Hemoglobin 9.7 (L) 11.5 - 15.5 g/dL    Hematocrit 29.3 (L) 34.0 - 48.0 %    MCV 94.5 80.0 - 99.9 fL    MCH 31.3 26.0 - 35.0 pg    MCHC 33.1 32.0 - 34.5 %    RDW 13.7 11.5 - 15.0 fL    Platelets 929 709 - 623 E9/L    MPV 11.5 7.0 - 12.0 fL    Neutrophils % 80.5 (H) 43.0 - 80.0 %    Immature Granulocytes % 0.6 0.0 - 5.0 %    Lymphocytes % 11.6 (L) 20.0 - 42.0 %    Monocytes % 6.0 2.0 - 12.0 %    Eosinophils % 1.1 0.0 - 6.0 %    Basophils % 0.2 0.0 - 2.0 %    Neutrophils Absolute 11.91 (H) 1.80 - 7.30 E9/L    Immature Granulocytes # 0.09 E9/L    Lymphocytes Absolute 1.71 1.50 - 4.00 E9/L    Monocytes Absolute 0.88 0.10 - 0.95 E9/L    Eosinophils Absolute 0.16 0.05 - 0.50 E9/L    Basophils Absolute 0.03 0.00 - 0.20 E9/L       A: 38 y.o. female [id number] at 44w9d  POD#1 S/P repeat low transverse  section  High risk multigravida of advanced maternal age  Gestational hypertension with severe features -normotensive since delivery   History of preeclampsia in prior pregnancy delivered at 35 weeks by  section\  History of prior  section x3  Declines   Breech presentation  Postop anemia of acute blood loss  Patient Active Problem List   Diagnosis    36 5/7 weeks gestation of pregnancy   

## 2021-03-07 NOTE — PLAN OF CARE
Problem: Anxiety:  Goal: Level of anxiety will decrease  Description: Level of anxiety will decrease  3/6/2021 1335 by Adela Izaguirre RN  Outcome: Met This Shift     Problem: Aspiration - Risk of:  Goal: Absence of aspiration  Description: Absence of aspiration  3/6/2021 1335 by Adela Izaguirre RN  Outcome: Met This Shift     Problem: Tissue Perfusion - Uteroplacental, Altered:  Goal: Absence of abnormal fetal heart rate pattern  Description: Absence of abnormal fetal heart rate pattern  3/6/2021 1335 by Adela Izaguirre RN  Outcome: Met This Shift     Problem: Venous Thromboembolism - Risk of:  Goal: Will show no signs or symptoms of venous thromboembolism  Description: Will show no signs or symptoms of venous thromboembolism  3/6/2021 1335 by Adela Izaguirre RN  Outcome: Met This Shift     Problem: Discharge Planning:  Goal: Discharged to appropriate level of care  Description: Discharged to appropriate level of care  3/6/2021 1335 by Adela Izaguirre RN  Outcome: Met This Shift     Problem: Fluid Volume - Imbalance:  Goal: Absence of postpartum hemorrhage signs and symptoms  Description: Absence of postpartum hemorrhage signs and symptoms  3/6/2021 1335 by Adela Izaguirre RN  Outcome: Met This Shift  Goal: Absence of imbalanced fluid volume signs and symptoms  Description: Absence of imbalanced fluid volume signs and symptoms  3/6/2021 1335 by Adela Izaguirre RN  Outcome: Met This Shift     Problem: Infection - Surgical Site:  Goal: Will show no infection signs and symptoms  Description: Will show no infection signs and symptoms  3/6/2021 1335 by Adela Izaguirre RN  Outcome: Met This Shift     Problem: Mood - Altered:  Goal: Mood stable  Description: Mood stable  3/7/2021 0015 by Laurie St RN  Outcome: Met This Shift  3/6/2021 1335 by Adela Izaguirre RN  Outcome: Met This Shift     Problem: Nausea/Vomiting:  Goal: Absence of nausea/vomiting  Description: Absence of nausea/vomiting  3/7/2021 0015 by Miguel Ángel Ryder RN  Outcome: Met This Shift  3/6/2021 1335 by Karen Li RN  Outcome: Met This Shift     Problem: Pain - Acute:  Goal: Pain level will decrease  Description: Pain level will decrease  3/7/2021 0015 by Miguel Ángel Ryder RN  Outcome: Met This Shift  3/6/2021 1335 by Karen Li RN  Outcome: Met This Shift     Problem: Urinary Retention:  Goal: Urinary elimination within specified parameters  Description: Urinary elimination within specified parameters  3/7/2021 0015 by Miguel Ángel Ryder RN  Outcome: Met This Shift  3/6/2021 1335 by Karen Li RN  Outcome: Met This Shift     Problem: Venous Thromboembolism:  Goal: Will show no signs or symptoms of venous thromboembolism  Description: Will show no signs or symptoms of venous thromboembolism  3/6/2021 1335 by Karen Li RN  Outcome: Met This Shift  Goal: Absence of signs or symptoms of impaired coagulation  Description: Absence of signs or symptoms of impaired coagulation  3/6/2021 1335 by Karen Li RN  Outcome: Met This Shift

## 2021-03-07 NOTE — FLOWSHEET NOTE
Patient helped to bathroom. Voided large amount without difficulty. Annita care given and explained. Assisted back to bed. Tolerated ambulating well.

## 2021-03-08 VITALS
WEIGHT: 238 LBS | HEART RATE: 80 BPM | HEIGHT: 64 IN | OXYGEN SATURATION: 98 % | RESPIRATION RATE: 14 BRPM | DIASTOLIC BLOOD PRESSURE: 75 MMHG | BODY MASS INDEX: 40.63 KG/M2 | TEMPERATURE: 98 F | SYSTOLIC BLOOD PRESSURE: 135 MMHG

## 2021-03-08 PROBLEM — O34.219 PREVIOUS CESAREAN SECTION COMPLICATING PREGNANCY: Status: RESOLVED | Noted: 2021-03-05 | Resolved: 2021-03-08

## 2021-03-08 PROBLEM — O09.523 HIGH RISK PREGNANCY, MULTIGRAVIDA OF ADVANCED MATERNAL AGE IN THIRD TRIMESTER: Status: RESOLVED | Noted: 2021-03-05 | Resolved: 2021-03-08

## 2021-03-08 PROBLEM — D50.9 IRON DEFICIENCY ANEMIA OF PREGNANCY: Status: RESOLVED | Noted: 2021-03-06 | Resolved: 2021-03-08

## 2021-03-08 PROBLEM — O34.219 DECLINES VBAC (VAGINAL BIRTH AFTER CESAREAN) TRIAL: Status: RESOLVED | Noted: 2021-03-05 | Resolved: 2021-03-08

## 2021-03-08 PROBLEM — O09.293 HISTORY OF PRE-ECLAMPSIA IN PRIOR PREGNANCY, CURRENTLY PREGNANT IN THIRD TRIMESTER: Status: RESOLVED | Noted: 2021-03-05 | Resolved: 2021-03-08

## 2021-03-08 PROBLEM — D62 POSTOPERATIVE ANEMIA DUE TO ACUTE BLOOD LOSS: Status: ACTIVE | Noted: 2021-03-08

## 2021-03-08 PROBLEM — O16.3 ELEVATED BLOOD PRESSURE AFFECTING PREGNANCY IN THIRD TRIMESTER, ANTEPARTUM: Status: RESOLVED | Noted: 2021-03-05 | Resolved: 2021-03-08

## 2021-03-08 PROBLEM — Z3A.36 36 WEEKS GESTATION OF PREGNANCY: Status: RESOLVED | Noted: 2021-03-05 | Resolved: 2021-03-08

## 2021-03-08 PROBLEM — O99.019 IRON DEFICIENCY ANEMIA OF PREGNANCY: Status: RESOLVED | Noted: 2021-03-06 | Resolved: 2021-03-08

## 2021-03-08 PROCEDURE — 6370000000 HC RX 637 (ALT 250 FOR IP): Performed by: OBSTETRICS & GYNECOLOGY

## 2021-03-08 RX ORDER — PSEUDOEPHEDRINE HCL 30 MG
100 TABLET ORAL 2 TIMES DAILY PRN
COMMUNITY
Start: 2021-03-08 | End: 2021-04-26

## 2021-03-08 RX ORDER — IBUPROFEN 600 MG/1
600 TABLET ORAL EVERY 6 HOURS PRN
Qty: 30 TABLET | Refills: 0 | Status: SHIPPED | OUTPATIENT
Start: 2021-03-08 | End: 2021-04-26

## 2021-03-08 RX ORDER — SIMETHICONE 80 MG
80 TABLET,CHEWABLE ORAL EVERY 6 HOURS PRN
Refills: 0 | COMMUNITY
Start: 2021-03-08 | End: 2021-04-26

## 2021-03-08 RX ORDER — FERROUS SULFATE 325(65) MG
325 TABLET ORAL 2 TIMES DAILY WITH MEALS
Qty: 30 TABLET | Refills: 1 | Status: SHIPPED | OUTPATIENT
Start: 2021-03-08 | End: 2021-04-26

## 2021-03-08 RX ORDER — MODIFIED LANOLIN
1 OINTMENT (GRAM) TOPICAL
COMMUNITY
Start: 2021-03-08 | End: 2021-04-26

## 2021-03-08 RX ADMIN — METRONIDAZOLE 500 MG: 500 TABLET ORAL at 06:40

## 2021-03-08 RX ADMIN — IBUPROFEN 600 MG: 600 TABLET ORAL at 02:32

## 2021-03-08 RX ADMIN — IBUPROFEN 600 MG: 600 TABLET ORAL at 08:31

## 2021-03-08 RX ADMIN — FERROUS SULFATE TAB 325 MG (65 MG ELEMENTAL FE) 325 MG: 325 (65 FE) TAB at 08:32

## 2021-03-08 RX ADMIN — DOCUSATE SODIUM 100 MG: 100 CAPSULE, LIQUID FILLED ORAL at 08:31

## 2021-03-08 RX ADMIN — METFORMIN HYDROCHLORIDE: 500 TABLET, EXTENDED RELEASE ORAL at 08:31

## 2021-03-08 ASSESSMENT — PAIN DESCRIPTION - FREQUENCY: FREQUENCY: INTERMITTENT

## 2021-03-08 ASSESSMENT — PAIN DESCRIPTION - RADICULAR PAIN: RADICULAR_PAIN: ABSENT

## 2021-03-08 ASSESSMENT — PAIN SCALES - GENERAL
PAINLEVEL_OUTOF10: 0
PAINLEVEL_OUTOF10: 6

## 2021-03-08 ASSESSMENT — PAIN DESCRIPTION - DESCRIPTORS: DESCRIPTORS: SORE

## 2021-03-08 ASSESSMENT — PAIN DESCRIPTION - PROGRESSION: CLINICAL_PROGRESSION: GRADUALLY WORSENING

## 2021-03-08 ASSESSMENT — PAIN DESCRIPTION - ONSET: ONSET: GRADUAL

## 2021-03-08 ASSESSMENT — PAIN - FUNCTIONAL ASSESSMENT: PAIN_FUNCTIONAL_ASSESSMENT: ACTIVITIES ARE NOT PREVENTED

## 2021-03-08 ASSESSMENT — PAIN DESCRIPTION - LOCATION: LOCATION: ABDOMEN

## 2021-03-08 NOTE — PROGRESS NOTES
Discharge and follow up instructions given with mother/infant teaching and verbally understood. Also reviewed safe sleep and shaken baby and post partum depression.

## 2021-03-08 NOTE — PROGRESS NOTES
POD #2    Patient:  Terri Cuevas     Admit Date:  3/4/2021 10:46 PM  Medical Record Number:  [id number]   Today's Date: 3/8/2021    S: No complaints - would like to go home today; tolerating diet: yes - general; ambulating well: yes - up in room and halls; voiding without difficulty:  yes - no urinary complaints; bm: yes - normal; flatus: yes - normas; pain meds appropriate: yes - Ibuprofen; vaginal bleeding: less than a period.     O:   Vitals:    03/07/21 0936 03/07/21 1600 03/07/21 2300 03/08/21 0845   BP:  137/73 (!) 140/75 135/75   Pulse: 72 87 69 80   Resp: 15 16 16 14   Temp:  98.2 °F (36.8 °C) 97.8 °F (36.6 °C) 98 °F (36.7 °C)   TempSrc:  Oral Oral Oral   SpO2: 98%      Weight:       Height:         Gen: A&O, cooperative, pleasant   Heart: RRR   Lungs: CTA b/l   Abd; soft, NT, non distended, +BS  Back: no CVA or paraspinal tenderness   Ext: No clubbing, cyanosis, edema:trace , no cords palpable, no calf tenderness   Neuro: intact   Inc: clean, dry, intact with Mepilex  Uterus: firm, well contracted, nt   Annita pad: staining only, thin lochia    Recent Results (from the past 72 hour(s))   CBC auto differential    Collection Time: 03/07/21  5:55 AM   Result Value Ref Range    WBC 14.8 (H) 4.5 - 11.5 E9/L    RBC 3.10 (L) 3.50 - 5.50 E12/L    Hemoglobin 9.7 (L) 11.5 - 15.5 g/dL    Hematocrit 29.3 (L) 34.0 - 48.0 %    MCV 94.5 80.0 - 99.9 fL    MCH 31.3 26.0 - 35.0 pg    MCHC 33.1 32.0 - 34.5 %    RDW 13.7 11.5 - 15.0 fL    Platelets 278 935 - 207 E9/L    MPV 11.5 7.0 - 12.0 fL    Neutrophils % 80.5 (H) 43.0 - 80.0 %    Immature Granulocytes % 0.6 0.0 - 5.0 %    Lymphocytes % 11.6 (L) 20.0 - 42.0 %    Monocytes % 6.0 2.0 - 12.0 %    Eosinophils % 1.1 0.0 - 6.0 %    Basophils % 0.2 0.0 - 2.0 %    Neutrophils Absolute 11.91 (H) 1.80 - 7.30 E9/L    Immature Granulocytes # 0.09 E9/L    Lymphocytes Absolute 1.71 1.50 - 4.00 E9/L    Monocytes Absolute 0.88 0.10 - 0.95 E9/L    Eosinophils Absolute 0.16 0.05 - 0.50 E9/L    Basophils Absolute 0.03 0.00 - 0.20 E9/L       A: 38 y.o. female [id number] at 44w9d - requesting day #1 discharge  POD#2 S/P repeat low transverse  section  High risk multigravida of advanced maternal age  Gestational hypertension with severe features -normotensive since delivery   History of preeclampsia in prior pregnancy delivered at 35 weeks by  section\  History of prior  section x3  Declines   Breech presentation  Post op anemia of acute blood loss  Patient Active Problem List   Diagnosis    36 5/7 weeks gestation of pregnancy    High risk pregnancy, multigravida of advanced maternal age in third trimester    History of pre-eclampsia in prior pregnancy, currently pregnant in third trimester    Previous  section x 3 complicating pregnancy    Declines  (vaginal birth after ) trial    Elevated blood pressure affecting pregnancy in third trimester, antepartum    Gestational hypertension affecting fourth pregnancy with severe features    Breech presentation    Iron deficiency anemia of pregnancy     delivery, delivered, current hospitalization    Term birth of  male    Postoperative anemia due to acute blood loss       P: Routine post-op care. Discharge home with instructions, precautions. Prescription for Ibuprofen 600 mg. May continue over-the-counter Simethicone and Colace PRN. Continue PNV with Iron. Rx FeSO4 q day. Follow-up office 1 week for incision check, and 6-8 weeks for final post-op visit.     Kacy Swann MD FACOG  3/8/2021 9:35 AM

## 2021-03-08 NOTE — DISCHARGE SUMMARY
Department of Obstetrics and Gynecology  Labor and Delivery  Discharge Summary    Patient:  Bibi Paez         Medical Record Number:  [id number]    Admit Date:  3/4/2021 10:46 PM    Discharge Date: 3/8/2021    Final Diagnosis:   Active Problems:    Postoperative anemia due to acute blood loss  Resolved Problems:    36 5/7 weeks gestation of pregnancy    Gestational hypertension affecting fourth pregnancy with severe features    High risk pregnancy, multigravida of advanced maternal age in third trimester    History of pre-eclampsia in prior pregnancy, currently pregnant in third trimester    Previous  section x 3 complicating pregnancy    Declines  (vaginal birth after ) trial    Breech presentation    Iron deficiency anemia of pregnancy     delivery, delivered, current hospitalization    Term birth of  male    Elevated blood pressure affecting pregnancy in third trimester, antepartum      Chief Complaint - Presenting Illness - Physical Findings:   36 weeks gestation of pregnancy [Z3A.36]  Normal pregnancy, third trimester [Z34.93]    HPI: Indication For Procedure: The patient is a 35 y.o. W female [id number] at 36w4d. Patient presented 3/5/21 with a chief complaint of increased swelling on the day of presentation, so she started monitoring her BP's which gradually increased from the 130/80 to the 140-150/80-90's. Fingers tingly - a little swollen - had severe preeclampsia with her first and was delivered by caesarean section at 35 weeks. She had 2 additional  sections subsequent to that. Had COVID 19 two weeks ago.  During the observation.  Patient's blood pressures were labile.  A dose of Celestone was administered.  A maternal-fetal medicine consultation with Dr. Mirta Shipley was obtained due to prematurity.  A fetal ultrasound assessment was performed which identified a living newsome intrauterine fetus was identified in the breech presentation, with normal fetal heart motion and normal fetal motion noted.  The amniotic fluid index was 11.6 cm.  The estimated fetal weight was at the 89th growth percentile for gestational age. Leoncio Rosanne of the fetal anatomy was limited secondary to the advanced gestational age of the fetus and fetal position.  The biophysical profile and cord Doppler studies were both reassuring. Paul Mooney was no evidence of absence, or reversal of end-diastolic flow. Patient was diagnosed with gestational hypertension with intermittent severe hypertension (164/90, 169/83, 168/88) and admitted for continued blood pressure pressure monitoring and administration of the second dose of Celestone followed by delivery.  Delivery would be indicated as advised by Dr. Marisol Russell based on the ACOG Committee Opinion #818 from 2021 secondary to gestational hypertension with intermittent severe elevations. Management options were reviewed, the risks of surgery were discussed, including, but not limited to that of anesthesia, infection, hemorrhage, transfusion, blood borne disease, bowel/bladder/ureteral/vascular injury, and any corrective surgeries (bladder, ureter, bowel, hysterectomy), uterine or cervical lacerations, injury to the baby, DVT,PE, pain and death.  Questions were answered to both the patient's and FOB/family satisfaction and informed consent was obtained to proceed as planned with an elective repeat low transverse  delivery. Hospital Course:   Delivery Summary:  Date of Procedure: 3/6/2021     Pre-Op Diagnosis: High risk multigravida of advanced maternal age at 42 weeks 5 days; Gestational hypertension with severe features;  History of preeclampsia in prior pregnancy delivered at 35 weeks by  section; history of prior  section x3; declines ; breech presentation      Patient Active Problem List   Diagnosis    36 5/7 weeks gestation of pregnancy    High risk pregnancy, multigravida of advanced maternal age in third trimester    History of pre-eclampsia in prior pregnancy, currently pregnant in third trimester    Previous  section x 3 complicating pregnancy    Declines  (vaginal birth after ) trial    Elevated blood pressure affecting pregnancy in third trimester, antepartum    Gestational hypertension affecting fourth pregnancy with severe features    Breech presentation    Iron deficiency anemia of pregnancy      Post-Op Diagnosis: Same, viable male infant delivered      Patient Active Problem List   Diagnosis    36 5/7 weeks gestation of pregnancy    High risk pregnancy, multigravida of advanced maternal age in third trimester    History of pre-eclampsia in prior pregnancy, currently pregnant in third trimester    Previous  section x 3 complicating pregnancy    Declines  (vaginal birth after ) trial    Elevated blood pressure affecting pregnancy in third trimester, antepartum    Gestational hypertension affecting fourth pregnancy with severe features    Breech presentation    Iron deficiency anemia of pregnancy     delivery, delivered, current hospitalization    Term birth of  male       Procedure(s): Repeat Low Transverse  Section Via Pfannenstiel skin incision     Surgeon(s): Rowland Lanes, MD     Assistant: Kevin Wang MD     Anesthesia: Spinal, with Duramorph     Complications: None     Estimated Blood Loss (mL): 500     Fluids Replaced (mL): 1500     Urine Output (mL): 500, clear     Drains:   Urethral Catheter Non-latex 16 fr (Active)      Implants:  * No implants in log *      Intraoperative Medications: Ancef 2 g IV on-call to the OR; intravenous Pitocin drip after delivery of placenta; Toradol 30 mg IV at conclusion of the case.     Findings:  Live Born [birth year]2021 at [address] [place] [address]  Sex:   Male  Fetal Position:  Breech, right sacrum anterior  Apgars:  1 minute:  9; 5 minute:  9  Weight:  7# 4oz, 3290 grams  Nuchal Cord: was not present  Placenta: was delivered with gentle traction and was noted to be intact, whole and that the umbilical cord had three vessels noted. Tubes, uterus, ovaries:  Normal     Specimens:  placenta sent to pathology     Condition:    Infant stable, transfer to post anesthesia recovery then to [place] [address] Nursery  Mother stable, transfer to post anesthesia recovery then to Maternity     Detailed Description of Procedure: The patient was taken to the operating room, where patient identification was confirmed and a Time Out was performed. Duramorph spinal anesthesia was found to be adequate. She was prepped with a DuraPrep solution and draped in the usual sterile fashion in the dorsal supine position with leftward tilt. A Pfannenstiel skin incision was then made with a scalpel through the previous  section scar  and carried to the underlying lay of fascia with the Bovie. The fascia was nicked in the midline and incision was extended laterally with the Hayden scissors. Superior aspect of the fascial incision was grasped with Kocher clamps and elevated. The underlying rectus muscle was dissected using sharp and blunt dissection. Attention was then turned to the inferior aspect of this incision, which in a similar fashion was grasped with Kocher clamps and elevated. The underlying rectus muscle was dissected off using sharp and blunt dissection. The rectus muscles were then  in the midline, peritoneum identified, tented up, and entered with the Metzenbaum scissors. The peritoneal incision was extended superiorly and inferiorly with good visualization of the bladder. The bladder blade was then inserted and the vesicouterine peritoneum identified, grasped with pickups, entered sharply with Metzenbaum scissors. This incision was extended laterally and the bladder flap created digitally. The bladder blade was then removed.   The large Des retractor was then inserted and the lower uterine segment incised in a transverse fasion with the scalpel. The uterine incision was then extended laterally with blunt digital dissection. The infant's breech delivered atraumatically by complete breech extraction with the appropriate amount of fundal pressure. The nose and mouth were suctioned with bulb suction and the remaining infant was delivered. The cord was clamped and cut after a 30 second delay. The infant was handed off to the waiting nurses. The placenta was then removed with gentle cord traction and the uterus exteriorized and cleared of all clot and debris. The uterine incision was then repaired with 1 Chromic in a running, locked fashion. A second layer of the same suture was used to obtain excellent hemostasis. The bladder flap was repaired with 3-0 Vicryl in a running stitch and the uterus was returned to the abdomen. The gutters were then cleared of all clot and debris. The anterior abdominal wall peritoneum was closed with a running stitch of 3-0 Vicryl. The fascia was reapproximated with 1 Stratafix in a running fashion. The subcutaneous tissue was reapproximated in two layers, using a running stitch of 3-0 plain suture. The skin was closed with the Insorb subcuticular stapling device. The Mepilex Border Post-op Ag Dressing was placed as a sterile bandage. The patient tolerated the procedure well. Sponge, lap, needle, and instruments were correct x2. Two g of Ancef were given on call to the OR, followed by Pitocin drip after delivery of the placenta. The patient was taken to the Recovery Room in awake, stable, postoperative state. The patient was transferred to the recovery room with her IV, Trimble, and SCD's from OR in place, where she was given IV Toradol to supplement her Duramorph Spinal for pain management. Her IV antibiotics were continued for 24 hour coverage in addition to 48 hours of Flagyl 500 q 8 hours.       On the first postoperative day her IV, IVF, IV medications, SCD's and Trimble were discontinued. She was started on the PO pain med Motrin 600 mg. She was encouraged to advance her diet and activities as tolerated. The patient had an unremarkable Hospital Course and requested an early discharge. Her care was advanced per routine protocol. Her vital signs were stable, she remained afebrile, and her physical exam was unremarkable throughout her hospitalization. She was subsequently discharged home on the second Hospital Day as she was tolerating her diet, ambulating well, voiding without difficulty and had appropriate flatus with a bowel movement.     Recent Results (from the past 72 hour(s))   CBC auto differential    Collection Time: 03/07/21  5:55 AM   Result Value Ref Range    WBC 14.8 (H) 4.5 - 11.5 E9/L    RBC 3.10 (L) 3.50 - 5.50 E12/L    Hemoglobin 9.7 (L) 11.5 - 15.5 g/dL    Hematocrit 29.3 (L) 34.0 - 48.0 %    MCV 94.5 80.0 - 99.9 fL    MCH 31.3 26.0 - 35.0 pg    MCHC 33.1 32.0 - 34.5 %    RDW 13.7 11.5 - 15.0 fL    Platelets 171 382 - 204 E9/L    MPV 11.5 7.0 - 12.0 fL    Neutrophils % 80.5 (H) 43.0 - 80.0 %    Immature Granulocytes % 0.6 0.0 - 5.0 %    Lymphocytes % 11.6 (L) 20.0 - 42.0 %    Monocytes % 6.0 2.0 - 12.0 %    Eosinophils % 1.1 0.0 - 6.0 %    Basophils % 0.2 0.0 - 2.0 %    Neutrophils Absolute 11.91 (H) 1.80 - 7.30 E9/L    Immature Granulocytes # 0.09 E9/L    Lymphocytes Absolute 1.71 1.50 - 4.00 E9/L    Monocytes Absolute 0.88 0.10 - 0.95 E9/L    Eosinophils Absolute 0.16 0.05 - 0.50 E9/L    Basophils Absolute 0.03 0.00 - 0.20 E9/L         Physicians Following Patient During Hospitalization - Reason For Care:  Admitting Physician: Edna Christianson  Operating Physician: Tayla Cardenas Physician(s):    POD#1 [place]   POD#2 Edna Christianson  Discharging Physician: Edna Christianson  Consultant(s): n/a    Discharge Condition:  · Level of Function:  Stable  · Caregiver Arrangements and Educational Efforts: Written Discharge Instructions were verbally reviewed and given to the Patient. · Special Problems: None    Plans For Continuing Care:2  · Unreported Test Results and Intended Follow-Up: 2 week(s) time. · Instructions for Physical Activity: No driving for 2 week(s). No sex or tampon use for 6 weeks. No douching. No heavy lifting (greater than baby and carrier) for 6 weeks. Frequent ambulation with stairs - start slowly then increase as tolerated. Return to work in 10 -8 weeks. Showers are fine - avoid bath tubs, hot tubs, swimming. · Instructions for Diet: Regular diet  · Discharge Medications:  Current Discharge Medication List      START taking these medications    Details   ibuprofen (ADVIL;MOTRIN) 600 MG tablet Take 1 tablet by mouth every 6 hours as needed for Pain (pain)  Qty: 30 tablet, Refills: 0      lansinoh lanolin CREA ointment Apply 1 applicator topically every hour as needed for Dry Skin (nipple discomfort)      ferrous sulfate (IRON 325) 325 (65 Fe) MG tablet Take 1 tablet by mouth 2 times daily (with meals)  Qty: 30 tablet, Refills: 1      docusate sodium (COLACE, DULCOLAX) 100 MG CAPS Take 100 mg by mouth 2 times daily as needed for Constipation      simethicone (MYLICON) 80 MG chewable tablet Take 1 tablet by mouth every 6 hours as needed for Flatulence  Refills: 0         CONTINUE these medications which have NOT CHANGED    Details   Prenat w/o E-VI-Qvmbyyx-FA-DHA (PNV-DHA PO) Take by mouth         STOP taking these medications       aspirin 81 MG EC tablet Comments:   Reason for Stopping:              Follow-Up Visit Plan: In 1 week for incision check and in 6-8 weeks for final postpartum visit.  Disposition: Home    Time Spent on Discharge:  30 minutes were spent in patient examination, evaluation, counseling as well as medication reconciliation, prescriptions for required medications, discharge plan and follow up.       Alejandra Redmond MD,FACOG    3/8/2021 9:46 AM

## 2021-03-08 NOTE — DISCHARGE INSTR - ACTIVITY
After Your Delivery Discharge Instructions    What to do after you leave the hospital:  Recommended diet: regular diet    Recommended activity: No driving for 2 weeks. No sex or tampon use for 6 weeks. No douching. No heavy lifting (greater than baby and carrier) for 6 weeks. Initially, avoid frequent ambulation with stairs - start slowly then increase as tolerated. You may return to work in 10 -8 weeks. Showers are fine - avoid bath tubs, hot tubs, swimming. Follow-up in 1 week for incision check and in 6 weeks for final postpartum visit.     Call the Physician with any of these  signs and symptoms:    Warning signs regarding incision:  · \"Popping\" of stitches or staples  · Foul smelling discharge or pus  · More redness or streaks around surgical incision than before    Incision care:  · Keep incision uncovered  · No tub baths until OK'd by your Physician      After your delivery - signs and symptoms to watch for:  · Fever - Oral temperature greater than 100.4 degrees Fahrenheit  · Foul-smelling vaginal discharge  · Headache unrelieved by \"pain meds\"  · Difficulty urinating  · Breasts reddened, hard, hot to the touch  · Nipple discharge which is foul-smelling or contains pus  · Increased pain at the site of the surgical incision  · Difficulty breathing with or without chest pain  · New calf pain especially if only on one side  · Sudden, continuing increased vaginal bleeding (heavier than a period) with or without clots  · Unrelieved feelings of:  · Inability to cope  · Sadness  · Anxiety  · Lack of interest in baby  · Insomnia  · Crying     What to do at home:  · Resume activity gradually   · Don't lift anything heavier than baby and carrier until OK'd by your Physician   · No sex until OK'd by your Physician  · Eat regular nutritious meals  · Take pain medication as prescribed whenever you need them  · To avoid/relieve constipation take stool softeners if advised   · Increase fiber in your diet    Most importantly ENJOY your Baby!  - Dr. Abram Purcell =)))    Please call immediately with Questions and Concerns - [phone] (Office) or [phone] (Answering Service) after hours and weekends. By signing below, I understand that if any emergent problems occur, once I leave the hospital, I am to dial #[phone] or go immediately to the nearest Emergency Room. For less emergent matters,  Dr. Anh Moyer can be reached by calling his Office or  answering service at the above numbers. I understand and acknowledge receipt of the instructions indicated above.

## 2021-03-09 NOTE — PROGRESS NOTES
CLINICAL PHARMACY NOTE: MEDS TO [address] Select Patient?: No  Total # of Prescriptions Filled: 1   The following medications were delivered to the patient:  ·  mg  Total # of Interventions Completed: 7  Time Spent (min): 75    Additional Documentation:

## 2021-11-26 ENCOUNTER — OFFICE VISIT (OUTPATIENT)
Dept: FAMILY MEDICINE CLINIC | Age: 36
End: 2021-11-26
Payer: COMMERCIAL

## 2021-11-26 VITALS
BODY MASS INDEX: 35.16 KG/M2 | SYSTOLIC BLOOD PRESSURE: 122 MMHG | HEART RATE: 72 BPM | WEIGHT: 211 LBS | RESPIRATION RATE: 18 BRPM | DIASTOLIC BLOOD PRESSURE: 77 MMHG | TEMPERATURE: 97.6 F | HEIGHT: 65 IN | OXYGEN SATURATION: 99 %

## 2021-11-26 DIAGNOSIS — D64.9 ANEMIA, UNSPECIFIED TYPE: Primary | ICD-10-CM

## 2021-11-26 DIAGNOSIS — K64.4 EXTERNAL HEMORRHOIDS: ICD-10-CM

## 2021-11-26 PROCEDURE — 99203 OFFICE O/P NEW LOW 30 MIN: CPT | Performed by: STUDENT IN AN ORGANIZED HEALTH CARE EDUCATION/TRAINING PROGRAM

## 2021-11-26 RX ORDER — DIAPER,BRIEF,INFANT-TODD,DISP
EACH MISCELLANEOUS
Qty: 30 G | Refills: 1 | Status: SHIPPED | OUTPATIENT
Start: 2021-11-26 | End: 2021-12-03

## 2021-11-26 ASSESSMENT — ENCOUNTER SYMPTOMS
ABDOMINAL PAIN: 0
CONSTIPATION: 0
DIARRHEA: 0
COUGH: 0
NAUSEA: 0
TROUBLE SWALLOWING: 0
PHOTOPHOBIA: 0
SHORTNESS OF BREATH: 0
EYE PAIN: 0
VOMITING: 0
ABDOMINAL DISTENTION: 0
SORE THROAT: 0

## 2021-11-26 ASSESSMENT — PATIENT HEALTH QUESTIONNAIRE - PHQ9
2. FEELING DOWN, DEPRESSED OR HOPELESS: 0
SUM OF ALL RESPONSES TO PHQ QUESTIONS 1-9: 0
SUM OF ALL RESPONSES TO PHQ QUESTIONS 1-9: 0
1. LITTLE INTEREST OR PLEASURE IN DOING THINGS: 0
SUM OF ALL RESPONSES TO PHQ QUESTIONS 1-9: 0
SUM OF ALL RESPONSES TO PHQ9 QUESTIONS 1 & 2: 0

## 2021-11-26 NOTE — PROGRESS NOTES
S: 39 y.o. female with   Chief Complaint   Patient presents with    Establish Care     possible external hemorrhoid       Possible external hemorrhoid - self treated at home. Had hx anemia post caesarian. O: VS:  height is 5' 5\" (1.651 m) and weight is 211 lb (95.7 kg). Her temporal temperature is 97.6 °F (36.4 °C). Her blood pressure is 122/77 and her pulse is 72. Her respiration is 18 and oxygen saturation is 99%. BP Readings from Last 3 Encounters:   11/26/21 122/77   04/26/21 116/78   03/15/21 110/70     See resident note      Impression/Plan:   1) Hemorrhoids - offer steroid for hemorrhoid tx. Health Maintenance Due   Topic Date Due    Varicella vaccine (1 of 2 - 2-dose childhood series) Never done    DTaP/Tdap/Td vaccine (1 - Tdap) 07/07/2012    Flu vaccine (1) Never done         Attending Physician Statement  I have discussed the case, including pertinent history and exam findings with the resident. I agree with the documented assessment and plan.       Renetta Brown MD

## 2021-11-26 NOTE — PROGRESS NOTES
2021    Boyd Screen is a 39 y.o. femalehere for: establish care    HPI:      hasn't been to doctor in 8 years  Has problem with hemorrhoids, issues with constipation  Couple months ago she has slight blood but now improving  Takes a stool softer and has been helping    Hx of pre-eclampsia with first pregnancy and emergent C/S  Has had pzifer vaccine for COVID  4 Children (15, 13, 11, and 9 month)  Cycle is regular and last 5 days    BP Readings from Last 3 Encounters:   21 122/77   21 116/78   03/15/21 110/70     Current Outpatient Medications   Medication Sig Dispense Refill    hydrocortisone (ALA-MORGAN) 1 % cream Apply topically 2 times daily. 30 g 1     No current facility-administered medications for this visit. Allergies   Allergen Reactions    Percocet [Oxycodone-Acetaminophen]     Procardia [Nifedipine]        Past Medical & Surgical History:      Diagnosis Date    Hypertension      Past Surgical History:   Procedure Laterality Date     SECTION      x3     SECTION N/A 3/6/2021     SECTION [facility]    COLPOSCOPY  2018    [facility]  01/15/2019       Family History:  History reviewed. No pertinent family history. Social History:  Social History     Tobacco Use    Smoking status: Never Smoker    Smokeless tobacco: Never Used   Substance Use Topics    Alcohol use: No     Comment: occ       Immunization History   Administered Date(s) Administered    COVID-19, Pfizer, PF, 30mcg/0.3mL 10/25/2021, 11/15/2021    Td, unspecified formulation 2012       Review of Systems   Constitutional: Negative for activity change, appetite change, chills, fatigue and fever. HENT: Negative for congestion, sore throat and trouble swallowing. Eyes: Negative for photophobia, pain and visual disturbance. Respiratory: Negative for cough and shortness of breath.     Cardiovascular: Negative for chest pain, palpitations and leg swelling. Gastrointestinal: Negative for abdominal distention, abdominal pain, constipation, diarrhea, nausea and vomiting. Endocrine: Negative for heat intolerance and polydipsia. Genitourinary: Negative for difficulty urinating, dysuria, frequency and hematuria. Musculoskeletal: Negative for joint swelling, neck pain and neck stiffness. Skin: Negative for rash and wound. Neurological: Negative for dizziness, syncope, weakness, light-headedness, numbness and headaches. Psychiatric/Behavioral: Negative for agitation, behavioral problems, confusion and sleep disturbance. VS:  /77   Pulse 72   Temp 97.6 °F (36.4 °C) (Temporal)   Resp 18   Ht 5' 5\" (1.651 m)   Wt 211 lb (95.7 kg)   SpO2 99%   BMI 35.11 kg/m²     Physical Exam  Constitutional:       General: She is not in acute distress. Appearance: Normal appearance. She is not ill-appearing. HENT:      Head: Normocephalic and atraumatic. Right Ear: External ear normal.      Left Ear: External ear normal.      Nose: Nose normal. No congestion. Mouth/Throat:      Mouth: Mucous membranes are moist.      Pharynx: Oropharynx is clear. Eyes:      General:         Right eye: No discharge. Left eye: No discharge. Extraocular Movements: Extraocular movements intact. Conjunctiva/sclera: Conjunctivae normal.   Cardiovascular:      Rate and Rhythm: Normal rate and regular rhythm. Pulses: Normal pulses. Heart sounds: Normal heart sounds. No murmur heard. Pulmonary:      Effort: Pulmonary effort is normal. No respiratory distress. Breath sounds: Normal breath sounds. No wheezing. Abdominal:      General: Bowel sounds are normal. There is no distension. Palpations: Abdomen is soft. There is no mass. Tenderness: There is no abdominal tenderness. There is no guarding or rebound. Musculoskeletal:         General: No swelling or tenderness. Normal range of motion. Cervical back: Normal range of motion and neck supple. Right lower leg: No edema. Left lower leg: No edema. Skin:     General: Skin is warm. Capillary Refill: Capillary refill takes less than 2 seconds. Coloration: Skin is not jaundiced. Neurological:      General: No focal deficit present. Mental Status: She is alert and oriented to person, place, and time. Mental status is at baseline. Psychiatric:         Mood and Affect: Mood normal.         Behavior: Behavior normal.         Thought Content: Thought content normal.         Judgment: Judgment normal.         Assessment/Plan:  1. Anemia, unspecified type  - will repeat lab work today. Anemia s/p C/S. Will check for iron panel if still anemic  - CBC WITH AUTO DIFFERENTIAL; Future    2. External hemorrhoids  - discussed about constipation regimen with miralax, fiber powder and colace. - encouraged hydration and exercise. - hydrocortisone (ALA-MORGAN) 1 % cream; Apply topically 2 times daily. Dispense: 30 g; Refill: 1      Follow up:  annually    Patient agrees with the above stated plan. Counseled regarding above diagnosis, including possible risks and complications,  especially if left uncontrolled. Counseled regarding the possible side effects, risks, benefits and alternatives to treatment;patient and/or guardian verbalizes understanding, agrees, feels comfortable with and wishes to proceed with above treatment plan. Call or go to ED immediately if symptoms worsen or persist. Advised patient to call with any new medication issues, and, as applicable, read all Rx info from pharmacy to assure aware of all possible risks and side effects of medicationbefore taking. Patient and/or guardian given opportunity to ask questions/raise concerns. The patient verbalized comfort and understanding ofinstructions. I encourage further reading and education about your health conditions.   Information on many health conditions is provided by [facility] [employer]: [email or web address]  Please bring any questions to me at your nextvisit.       Gunner Myers MD  Family Medicine PGY-3

## 2024-09-17 ENCOUNTER — HOSPITAL ENCOUNTER (OUTPATIENT)
Age: 39
Discharge: HOME | End: 2024-09-19

## 2024-09-17 PROCEDURE — 88305 TISSUE EXAM BY PATHOLOGIST: CPT

## 2024-09-20 LAB — SURGICAL PATHOLOGY REPORT: NORMAL

## (undated) DEVICE — SUTURE PLN GUT SZ 3-0 L27IN ABSRB YELLOWISH TAN L36MM CT-1 842H

## (undated) DEVICE — SHEET,DRAPE,53X77,STERILE: Brand: MEDLINE

## (undated) DEVICE — SUTURE CHROMIC GUT SZ 2-0 L36IN ABSRB BRN L36MM CT-1 1/2 923H

## (undated) DEVICE — CONTAINER SPEC 64OZ POLYPR PATH SNAP LOK CAP W/ LID

## (undated) DEVICE — STAPLER SKIN SQ 30 ABSRB STPL DISP INSORB

## (undated) DEVICE — BLADE SURG NO20 S STL STR DISP GLASSVAN

## (undated) DEVICE — APPLICATOR PREP 26ML 0.7% IOD POVACRYLEX 74% ISO ALC ST

## (undated) DEVICE — PEN: MARKING STD 100/CS: Brand: MEDICAL ACTION INDUSTRIES

## (undated) DEVICE — CONTAINER,SPEC,PNEUM TUBE,3OZ,STRL PATH: Brand: MEDLINE

## (undated) DEVICE — 3000CC GUARDIAN II: Brand: GUARDIAN

## (undated) DEVICE — TUBE BLD COLLECT ST 1 SIL COAT 7ML 10ML

## (undated) DEVICE — CATHETERIZATION KIT FOL16 FR 2000 CC DRAINAGE BG LUBRICATH

## (undated) DEVICE — TOWEL,OR,DSP,ST,BLUE,STD,6/PK,12PK/CS: Brand: MEDLINE

## (undated) DEVICE — PENCIL ES L3M BTTN SWCH HOLSTER W/ BLDE ELECTRD EDGE

## (undated) DEVICE — COVER,LIGHT HANDLE,FLX,2/PK: Brand: MEDLINE INDUSTRIES, INC.

## (undated) DEVICE — GOWN,SIRUS,FABRNF,L,20/CS: Brand: MEDLINE

## (undated) DEVICE — SUTURE VCRL + SZ 3-0 L36IN ABSRB UD L36MM CT-1 1/2 CIR VCP944H

## (undated) DEVICE — SUTURE VCRL + SZ 3-0 L27IN ABSRB UD L26MM SH 1/2 CIR VCP416H

## (undated) DEVICE — SUTURE STRATAFIX SYMMETRIC SZ 1 L18IN ABSRB VLT CT1 L36CM SXPP1A404

## (undated) DEVICE — TUBING, SUCTION, 3/16" X 12', STRAIGHT: Brand: MEDLINE

## (undated) DEVICE — ELECTRODE PT RET AD L9FT HI MOIST COND ADH HYDRGEL CORDED

## (undated) DEVICE — COUNTER NDL 30 COUNT DBL MAG

## (undated) DEVICE — MEDI-VAC YANKAUER SUCTION HANDLE W/BULBOUS TIP: Brand: CARDINAL HEALTH

## (undated) DEVICE — SUTURE CHROMIC GUT SZ 1 L36IN ABSRB BRN L40MM CT 1/2 CIR 915H

## (undated) DEVICE — CESAREAN BIRTH PACK II: Brand: MEDLINE INDUSTRIES, INC.

## (undated) DEVICE — Device: Brand: PORTEX

## (undated) DEVICE — GOWN,SIRUS,POLYRNF,BRTHSLV,XLN/XL,20/CS: Brand: MEDLINE

## (undated) DEVICE — GLOVE SURG SZ 65 L12IN FNGR THK83MIL CRM POLYISOPRENE

## (undated) DEVICE — EXTRA LARGE, DISPOSABLE C-SECTION PROTECTOR - RETRACTOR: Brand: ALEXIS ® O C-SECTION PROTECTOR - RETRACTOR

## (undated) DEVICE — SPONGE LAP W18XL18IN WHT COT 4 PLY FLD STRUNG RADPQ DISP ST

## (undated) DEVICE — HYPODERMIC SAFETY NEEDLE: Brand: MAGELLAN

## (undated) DEVICE — DRESSING FOAM POST OPERATIVE 4X10 IN MEPILEX BORDER AG

## (undated) DEVICE — GLOVE SURG SZ 75 L12IN FNGR THK83MIL CRM POLYISOPRENE